# Patient Record
Sex: FEMALE | Race: ASIAN | Employment: PART TIME | URBAN - METROPOLITAN AREA
[De-identification: names, ages, dates, MRNs, and addresses within clinical notes are randomized per-mention and may not be internally consistent; named-entity substitution may affect disease eponyms.]

---

## 2017-01-09 ENCOUNTER — ROUTINE PRENATAL (OUTPATIENT)
Dept: OBGYN CLINIC | Facility: CLINIC | Age: 37
End: 2017-01-09

## 2017-01-09 VITALS
HEIGHT: 63.25 IN | WEIGHT: 161 LBS | BODY MASS INDEX: 28.17 KG/M2 | SYSTOLIC BLOOD PRESSURE: 110 MMHG | HEART RATE: 88 BPM | DIASTOLIC BLOOD PRESSURE: 73 MMHG

## 2017-01-09 DIAGNOSIS — Z34.03 ENCOUNTER FOR SUPERVISION OF NORMAL FIRST PREGNANCY IN THIRD TRIMESTER: Primary | ICD-10-CM

## 2017-01-09 LAB
APPEARANCE: CLEAR
MULTISTIX LOT#: NORMAL NUMERIC
PH, URINE: 6.5 (ref 4.5–8)
SPECIFIC GRAVITY: 1.01 (ref 1–1.03)
URINE-COLOR: YELLOW
UROBILINOGEN,SEMI-QN: 0.2 MG/DL (ref 0–1.9)

## 2017-01-09 NOTE — PROGRESS NOTES
Feeling well, active baby. Denies SOL. GBS obtained. Reviewed birth plan which is consistent with routine midwifery care in labor. Full review of SOL a n.pedro pablo. Reviewed warning signs and importance of good FM.

## 2017-01-16 ENCOUNTER — ROUTINE PRENATAL (OUTPATIENT)
Dept: OBGYN CLINIC | Facility: CLINIC | Age: 37
End: 2017-01-16

## 2017-01-16 VITALS
DIASTOLIC BLOOD PRESSURE: 66 MMHG | BODY MASS INDEX: 29 KG/M2 | WEIGHT: 163 LBS | SYSTOLIC BLOOD PRESSURE: 106 MMHG | HEART RATE: 78 BPM

## 2017-01-16 DIAGNOSIS — Z34.83 ENCOUNTER FOR SUPERVISION OF OTHER NORMAL PREGNANCY IN THIRD TRIMESTER: Primary | ICD-10-CM

## 2017-01-16 LAB
APPEARANCE: CLEAR
MULTISTIX LOT#: NORMAL NUMERIC
PH, URINE: 6.5 (ref 4.5–8)
SPECIFIC GRAVITY: 1.01 (ref 1–1.03)
URINE-COLOR: YELLOW
UROBILINOGEN,SEMI-QN: 0 MG/DL (ref 0–1.9)

## 2017-01-19 ENCOUNTER — TELEPHONE (OUTPATIENT)
Dept: OBGYN CLINIC | Facility: CLINIC | Age: 37
End: 2017-01-19

## 2017-01-20 ENCOUNTER — OFFICE VISIT (OUTPATIENT)
Dept: FAMILY MEDICINE CLINIC | Facility: CLINIC | Age: 37
End: 2017-01-20

## 2017-01-20 VITALS
SYSTOLIC BLOOD PRESSURE: 95 MMHG | TEMPERATURE: 98 F | BODY MASS INDEX: 28.53 KG/M2 | DIASTOLIC BLOOD PRESSURE: 68 MMHG | HEART RATE: 63 BPM | WEIGHT: 163 LBS | HEIGHT: 63.25 IN

## 2017-01-20 DIAGNOSIS — Z00.00 ROUTINE GENERAL MEDICAL EXAMINATION AT A HEALTH CARE FACILITY: Primary | ICD-10-CM

## 2017-01-20 PROCEDURE — 99385 PREV VISIT NEW AGE 18-39: CPT | Performed by: FAMILY MEDICINE

## 2017-01-20 PROCEDURE — G0438 PPPS, INITIAL VISIT: HCPCS | Performed by: FAMILY MEDICINE

## 2017-01-20 NOTE — PROGRESS NOTES
HPI:    Patient ID: Pattie Maza is a 31year old female. HPI  Patient presents with:  Routine Physical    Review of Systems   Constitutional: Negative. HENT: Negative. Eyes: Negative. Respiratory: Negative. Cardiovascular: Negative.     Mabelene Jackson place, and time. She has normal strength and normal reflexes. No sensory deficit. Psychiatric: Her mood appears not anxious. She does not exhibit a depressed mood.               ASSESSMENT/PLAN:   Routine general medical examination at a [facility] facil

## 2017-01-24 ENCOUNTER — ROUTINE PRENATAL (OUTPATIENT)
Dept: OBGYN CLINIC | Facility: CLINIC | Age: 37
End: 2017-01-24

## 2017-01-24 VITALS
WEIGHT: 162.19 LBS | DIASTOLIC BLOOD PRESSURE: 69 MMHG | HEART RATE: 75 BPM | BODY MASS INDEX: 28 KG/M2 | SYSTOLIC BLOOD PRESSURE: 102 MMHG

## 2017-01-24 DIAGNOSIS — Z34.93 ENCOUNTER FOR SUPERVISION OF NORMAL PREGNANCY IN THIRD TRIMESTER, UNSPECIFIED GRAVIDITY: Primary | ICD-10-CM

## 2017-01-24 LAB
MULTISTIX LOT#: NORMAL NUMERIC
PH, URINE: 6.5 (ref 4.5–8)
SPECIFIC GRAVITY: 1 (ref 1–1.03)
UROBILINOGEN,SEMI-QN: 0.2 MG/DL (ref 0–1.9)

## 2017-01-24 PROCEDURE — 59426 ANTEPARTUM CARE ONLY: CPT | Performed by: ADVANCED PRACTICE MIDWIFE

## 2017-01-24 NOTE — PROGRESS NOTES
Feeling well. No complaints. +FM. Rev pt's written birth plan. They are undecided about circumcision.   Rev signs of active labor/when to call

## 2017-01-28 ENCOUNTER — HOSPITAL ENCOUNTER (INPATIENT)
Facility: HOSPITAL | Age: 37
LOS: 3 days | Discharge: HOME | End: 2017-01-31
Attending: ADVANCED PRACTICE MIDWIFE | Admitting: OBSTETRICS & GYNECOLOGY
Payer: COMMERCIAL

## 2017-01-28 DIAGNOSIS — O92.29 POSTPARTUM NIPPLE PAIN: Primary | ICD-10-CM

## 2017-01-28 PROBLEM — Z34.90 PREGNANCY: Status: ACTIVE | Noted: 2017-01-28

## 2017-01-28 PROBLEM — Z34.90 PREGNANCY (HCC): Status: ACTIVE | Noted: 2017-01-28

## 2017-01-28 RX ORDER — SODIUM CHLORIDE 0.9 % (FLUSH) 0.9 %
10 SYRINGE (ML) INJECTION AS NEEDED
Status: DISCONTINUED | OUTPATIENT
Start: 2017-01-28 | End: 2017-01-29 | Stop reason: HOSPADM

## 2017-01-28 RX ORDER — AMMONIA INHALANTS 0.04 G/.3ML
0.3 INHALANT RESPIRATORY (INHALATION) AS NEEDED
Status: DISCONTINUED | OUTPATIENT
Start: 2017-01-28 | End: 2017-01-29 | Stop reason: HOSPADM

## 2017-01-28 RX ORDER — TERBUTALINE SULFATE 1 MG/ML
0.25 INJECTION, SOLUTION SUBCUTANEOUS AS NEEDED
Status: DISCONTINUED | OUTPATIENT
Start: 2017-01-28 | End: 2017-01-29 | Stop reason: HOSPADM

## 2017-01-28 RX ORDER — DEXTROSE, SODIUM CHLORIDE, SODIUM LACTATE, POTASSIUM CHLORIDE, AND CALCIUM CHLORIDE 5; .6; .31; .03; .02 G/100ML; G/100ML; G/100ML; G/100ML; G/100ML
125 INJECTION, SOLUTION INTRAVENOUS CONTINUOUS
Status: DISCONTINUED | OUTPATIENT
Start: 2017-01-28 | End: 2017-01-29 | Stop reason: HOSPADM

## 2017-01-28 RX ORDER — IBUPROFEN 600 MG/1
600 TABLET ORAL ONCE AS NEEDED
Status: DISCONTINUED | OUTPATIENT
Start: 2017-01-28 | End: 2017-01-29 | Stop reason: HOSPADM

## 2017-01-28 RX ORDER — LIDOCAINE HYDROCHLORIDE 10 MG/ML
30 INJECTION, SOLUTION EPIDURAL; INFILTRATION; INTRACAUDAL; PERINEURAL ONCE
Status: DISCONTINUED | OUTPATIENT
Start: 2017-01-28 | End: 2017-01-29 | Stop reason: HOSPADM

## 2017-01-29 PROBLEM — D25.9 FIBROID UTERUS: Status: ACTIVE | Noted: 2017-01-29

## 2017-01-29 PROBLEM — Z37.9 NORMAL LABOR: Status: ACTIVE | Noted: 2017-01-29

## 2017-01-29 PROBLEM — Z37.9 NORMAL LABOR (HCC): Status: ACTIVE | Noted: 2017-01-29

## 2017-01-29 LAB
ERYTHROCYTE [DISTWIDTH] IN BLOOD BY AUTOMATED COUNT: 15.1 % (ref 11–15)
HCT VFR BLD AUTO: 40.4 % (ref 35–48)
HGB BLD-MCNC: 13.4 G/DL (ref 12–16)
MCH RBC QN AUTO: 31.9 PG (ref 27–32)
MCHC RBC AUTO-ENTMCNC: 33.1 G/DL (ref 32–37)
MCV RBC AUTO: 96.2 FL (ref 80–100)
PLATELET # BLD AUTO: 207 K/UL (ref 140–400)
PMV BLD AUTO: 10.3 FL (ref 7.4–10.3)
RBC # BLD AUTO: 4.2 M/UL (ref 3.7–5.4)
RH BLOOD TYPE: POSITIVE
WBC # BLD AUTO: 6.4 K/UL (ref 4–11)

## 2017-01-29 PROCEDURE — 0KQM0ZZ REPAIR PERINEUM MUSCLE, OPEN APPROACH: ICD-10-PCS | Performed by: ADVANCED PRACTICE MIDWIFE

## 2017-01-29 PROCEDURE — 59410 OBSTETRICAL CARE: CPT | Performed by: ADVANCED PRACTICE MIDWIFE

## 2017-01-29 RX ORDER — BISACODYL 10 MG
10 SUPPOSITORY, RECTAL RECTAL ONCE AS NEEDED
Status: ACTIVE | OUTPATIENT
Start: 2017-01-29 | End: 2017-01-29

## 2017-01-29 RX ORDER — DOCUSATE SODIUM 100 MG/1
100 CAPSULE, LIQUID FILLED ORAL 2 TIMES DAILY
Status: DISCONTINUED | OUTPATIENT
Start: 2017-01-29 | End: 2017-01-31

## 2017-01-29 RX ORDER — SIMETHICONE 80 MG
80 TABLET,CHEWABLE ORAL 3 TIMES DAILY PRN
Status: DISCONTINUED | OUTPATIENT
Start: 2017-01-29 | End: 2017-01-31

## 2017-01-29 RX ORDER — DIAPER,BRIEF,INFANT-TODD,DISP
EACH MISCELLANEOUS EVERY 6 HOURS PRN
Status: DISCONTINUED | OUTPATIENT
Start: 2017-01-29 | End: 2017-01-31

## 2017-01-29 RX ORDER — IBUPROFEN 400 MG/1
400 TABLET ORAL EVERY 4 HOURS PRN
Status: DISCONTINUED | OUTPATIENT
Start: 2017-01-29 | End: 2017-01-31

## 2017-01-29 RX ORDER — AMMONIA INHALANTS 0.04 G/.3ML
0.3 INHALANT RESPIRATORY (INHALATION) AS NEEDED
Status: DISCONTINUED | OUTPATIENT
Start: 2017-01-29 | End: 2017-01-31

## 2017-01-29 RX ORDER — IBUPROFEN 600 MG/1
600 TABLET ORAL EVERY 4 HOURS PRN
Status: DISCONTINUED | OUTPATIENT
Start: 2017-01-29 | End: 2017-01-31

## 2017-01-29 RX ORDER — PRENATAL VIT,CAL 76/IRON/FOLIC 29 MG-1 MG
1 TABLET ORAL DAILY
Status: DISCONTINUED | OUTPATIENT
Start: 2017-01-29 | End: 2017-01-31

## 2017-01-29 RX ORDER — MISOPROSTOL 200 UG/1
TABLET ORAL
Status: DISCONTINUED
Start: 2017-01-29 | End: 2017-01-29 | Stop reason: WASHOUT

## 2017-01-29 RX ORDER — IBUPROFEN 400 MG/1
200 TABLET ORAL EVERY 4 HOURS PRN
Status: DISCONTINUED | OUTPATIENT
Start: 2017-01-29 | End: 2017-01-31

## 2017-01-29 NOTE — LACTATION NOTE
LACTATION NOTE - MOTHER           Problems identified  Problems identified: Knowledge deficit    Maternal history  Other/comment: fibroids    Breastfeeding goal  Breastfeeding goal: To maintain breast milk feeding per patient goal    Maternal Assessment  B

## 2017-01-29 NOTE — H&P
[place] [facility] - [facility]    History & Physical    Lamont Ramos Patient Status:  Inpatient    1980 MRN [id number]   Location [address] C-D Attending Orestes Rebolledo, [address] Day # 1 PCP No primary care provider on file.      Date o Oral Tab Take 1 tablet by mouth daily. Disp:  Rfl:  Taking   Omega-3-acid Ethyl Esters 1 G Oral Cap Take 1 g by mouth daily. Disp:  Rfl:  Taking       Review of Systems:   As documented in HPI        Physical Exam:   Temp:  [97.8 °F (36.6 °C)] 97.8 °F (36. rubella  MMR PP      Abnormal finding on  screen  See above, f/u Level 2 normal nuchal & structural Survey      Pregnancy-Active Labor  Intermittent monitoring  Expectant mgmt      Fibroid uterus  Insert SL          Risks, benefits, alternatives a

## 2017-01-29 NOTE — LACTATION NOTE
This note was copied from the chart of [address] [place] [address].   LACTATION NOTE - INFANT    Evaluation Type  Evaluation Type: Inpatient    Problems & Assessment  Infant Assessment: Skin color: pink or appropriate for ethnicity  Muscle tone: Appropriate for GA    Feeding

## 2017-01-29 NOTE — PROGRESS NOTES
Pt up to BR with assist x 2. Ambulated with steady gait. Voided 400ml. Pericare discussed and demonstrated. Peripad, Ice pack, tucks pads, underwear applied. Clean gown and warm blankets on. Pt to w/c for tx to PP.

## 2017-01-29 NOTE — PROGRESS NOTES
Pt to PP room 361 via w/c in stable condition holding baby. Belongings to room. Accompanied by sig other. This RN continues care.

## 2017-01-29 NOTE — L&D DELIVERY NOTE
[facility]    Vaginal Delivery Note    [address] [facility] Patient Status:  Inpatient    1980 MRN [id number]   Location [address] C-D Attending Orestes Rebolledo, [address] Day # 1 PCP No primary care provider on file.      Kaylan Nuno condition.      Intake/Output   EBL:  350 ml      Ayleen Moya [place]   1/29/2017  3:37 AM

## 2017-01-30 LAB
HCT VFR BLD AUTO: 31.7 % (ref 35–48)
HGB BLD-MCNC: 10.6 G/DL (ref 12–16)
T PALLIDUM AB SER QL: NEGATIVE

## 2017-01-30 NOTE — LACTATION NOTE
LACTATION NOTE - MOTHER           Problems identified  Problems identified: Knowledge deficit; Nipple pain    Maternal history  Other/comment: fibroids    Breastfeeding goal  Breastfeeding goal: To maintain breast milk feeding per patient goal    Maternal A

## 2017-01-30 NOTE — LACTATION NOTE
This note was copied from the chart of [address] [place] [address]. LACTATION NOTE - INFANT    Evaluation Type  Evaluation Type: Inpatient    Problems & Assessment  Infant Assessment: Skin color: pink or appropriate for ethnicity; Minimal hunger cues present  Muscle tone:  Ap

## 2017-01-30 NOTE — LACTATION NOTE
This note was copied from the chart of [address] [place] [address].   LACTATION NOTE - INFANT    Evaluation Type  Evaluation Type: Inpatient    Problems & Assessment  Infant Assessment: Skin color: pink or appropriate for ethnicity;Hunger cues present  Muscle tone: Appropriat

## 2017-01-30 NOTE — PLAN OF CARE
Patient Centered Care    • Patient preferences are identified and integrated in the patient's plan of care Progressing          POSTPARTUM    • [address] normal postpartum course Progressing    • Optimize infant feeding at the breast Progr

## 2017-01-31 VITALS
RESPIRATION RATE: 16 BRPM | HEIGHT: 63 IN | DIASTOLIC BLOOD PRESSURE: 66 MMHG | HEART RATE: 83 BPM | SYSTOLIC BLOOD PRESSURE: 113 MMHG | WEIGHT: 162 LBS | BODY MASS INDEX: 28.7 KG/M2 | TEMPERATURE: 98 F

## 2017-01-31 NOTE — DISCHARGE SUMMARY
[facility]    Discharge Summary    [address] [facility] [address] Patient Status:  Inpatient    1980 MRN [id number]   Location [address] Attending John Mccrary, [address] Day # 3       Delivering OB Clinician: Ileana Blackmon

## 2017-01-31 NOTE — LACTATION NOTE
Primary RN notified [address] [facility] infant has not had stool today.  LC assisted mom w/ assembly and use of manual pump and advised to pump a few minutes after feedings to have supplement of EBM to spoon or cup feed infant after feeding on demand at breast until her rachel

## 2017-01-31 NOTE — PROGRESS NOTES
[place] [facility] - [facility]    OB/GYNE Progress Note      [address] [facility] [address] Patient Status:  Inpatient    1980 MRN [id number]   Location [facility] 3SE Attending Jeanie oJn, [address] Day # 3 PCP No primary care provider on file.        As 10.6* 01/30/2017   HCT 31.7* 01/30/2017    01/29/2017         Lab Results  Component Value Date   SPECGRAVITY 1.005 01/24/2017   GLUUR neg 01/24/2017   NITRITE neg 01/24/2017             JULIO Bennett  1/31/2017  10:14 AM

## 2017-02-10 ENCOUNTER — POSTPARTUM (OUTPATIENT)
Dept: OBGYN CLINIC | Facility: CLINIC | Age: 37
End: 2017-02-10

## 2017-02-10 VITALS
BODY MASS INDEX: 26.64 KG/M2 | HEIGHT: 63.25 IN | HEART RATE: 84 BPM | WEIGHT: 152.25 LBS | SYSTOLIC BLOOD PRESSURE: 115 MMHG | DIASTOLIC BLOOD PRESSURE: 72 MMHG

## 2017-02-10 NOTE — PROGRESS NOTES
Pt is 2 wks PP s/p  of viable male infant. Breastfeeding successfully with infant with adequate weight gain. Denies any S&S of PP depression. Bleeding decreasing. Adequate family support. Denies any abuse. Warning signs reviewed.  F/u 4 wks or 6 wk

## 2017-03-02 ENCOUNTER — TELEPHONE (OUTPATIENT)
Dept: OBGYN CLINIC | Facility: CLINIC | Age: 37
End: 2017-03-02

## 2017-03-02 DIAGNOSIS — Z34.03 ENCOUNTER FOR SUPERVISION OF NORMAL FIRST PREGNANCY IN THIRD TRIMESTER: Primary | ICD-10-CM

## 2017-03-02 NOTE — TELEPHONE ENCOUNTER
Msg left on VM that referral has been entered for the Breast Pump and pt will get a copy in the mail once it is approved.

## 2017-03-02 NOTE — TELEPHONE ENCOUNTER
PT STTS SHE ORDERED HER BREAST PUMP [facility]  PH#  [phone] EXT [phone] OPTION 5 , HOWEVER NEEDS A REFERRAL , PLS ADVS

## 2017-03-02 NOTE — TELEPHONE ENCOUNTER
Pt informed of message below. Pt advised to contact jorge a gutiérrez once she has approved referral. Pt verbalizes understanding.

## 2017-03-10 ENCOUNTER — OFFICE VISIT (OUTPATIENT)
Dept: OBGYN CLINIC | Facility: CLINIC | Age: 37
End: 2017-03-10

## 2017-03-10 VITALS
HEIGHT: 63.25 IN | WEIGHT: 152 LBS | SYSTOLIC BLOOD PRESSURE: 110 MMHG | HEART RATE: 79 BPM | DIASTOLIC BLOOD PRESSURE: 72 MMHG | BODY MASS INDEX: 26.6 KG/M2

## 2017-03-10 NOTE — PROGRESS NOTES
Patient here for postpartum check-up. Vaginal delivery @ 6 weeks ago with ALICJA NAVAS. Breastfeeding exclusively, on demand. Reports baby on small side, but Peds not having her supplement as of yet. Baby is satisfied after feedings. Just received birth plan.

## 2017-11-17 ENCOUNTER — OFFICE VISIT (OUTPATIENT)
Dept: FAMILY MEDICINE CLINIC | Facility: CLINIC | Age: 37
End: 2017-11-17

## 2017-11-17 VITALS
WEIGHT: 134.63 LBS | SYSTOLIC BLOOD PRESSURE: 103 MMHG | TEMPERATURE: 98 F | BODY MASS INDEX: 23.86 KG/M2 | HEART RATE: 67 BPM | DIASTOLIC BLOOD PRESSURE: 70 MMHG | HEIGHT: 63 IN

## 2017-11-17 DIAGNOSIS — Z00.00 ROUTINE GENERAL MEDICAL EXAMINATION AT A HEALTH CARE FACILITY: Primary | ICD-10-CM

## 2017-11-17 PROCEDURE — 99395 PREV VISIT EST AGE 18-39: CPT | Performed by: PHYSICIAN ASSISTANT

## 2017-11-17 NOTE — PROGRESS NOTES
HPI:   Francis Davis is a 30year old female who presents for physical exam.  She denies any acute problems or concerns. She has 9 month old son she delivered naturally. She denies any problems during pregnancy. No elevation of glucose level or anemia. of Systems:   CONSTITUTIONAL:  Denies unusual weight gain/loss, fever, chills, or fatigue. EENT:  Eyes:  Denies eye pain, visual loss, blurred vision, double vision or yellow sclerae.  Ears, Nose, Throat:  Denies hearing loss, congestion, runny nose or sor CLAD, no JVD, no thyromegaly. SKIN: No rashes, no skin lesion, no bruising, good turgor. HEART:  Regular rate and rhythm, no murmurs, rubs or gallops. LUNGS: Clear to auscultation bilterally, no rales/rhonchi/wheezing.   ABDOMEN:  Soft, nondistended, non

## 2017-12-20 NOTE — PROGRESS NOTES
[place] [facility] - [facility]    OB/GYNE Progress Note      [address] Patient Status:  Inpatient    1980 MRN [id number]   Location [facility] 3SE Attending Barbara Giraldo, [address] Day # 2 PCP No primary care provider on file.        As 01/24/2017   GLUUR neg 01/24/2017   NITRITE neg 01/24/2017             Alcira Porter CNM  1/30/2017  11:05 AM detailed exam details…

## 2018-03-28 ENCOUNTER — LAB ENCOUNTER (OUTPATIENT)
Dept: LAB | Age: 38
End: 2018-03-28
Attending: PHYSICIAN ASSISTANT
Payer: COMMERCIAL

## 2018-03-28 DIAGNOSIS — Z00.00 ROUTINE GENERAL MEDICAL EXAMINATION AT A HEALTH CARE FACILITY: ICD-10-CM

## 2018-03-28 LAB
ALBUMIN SERPL BCP-MCNC: 4.6 G/DL (ref 3.5–4.8)
ALBUMIN/GLOB SERPL: 1.6 {RATIO} (ref 1–2)
ALP SERPL-CCNC: 70 U/L (ref 32–100)
ALT SERPL-CCNC: 16 U/L (ref 14–54)
ANION GAP SERPL CALC-SCNC: 10 MMOL/L (ref 0–18)
AST SERPL-CCNC: 18 U/L (ref 15–41)
BASOPHILS # BLD: 0.1 K/UL (ref 0–0.2)
BASOPHILS NFR BLD: 1 %
BILIRUB SERPL-MCNC: 0.8 MG/DL (ref 0.3–1.2)
BUN SERPL-MCNC: 12 MG/DL (ref 8–20)
BUN/CREAT SERPL: 13.8 (ref 10–20)
CALCIUM SERPL-MCNC: 9.3 MG/DL (ref 8.5–10.5)
CHLORIDE SERPL-SCNC: 101 MMOL/L (ref 95–110)
CHOLEST SERPL-MCNC: 174 MG/DL (ref 110–200)
CO2 SERPL-SCNC: 28 MMOL/L (ref 22–32)
CREAT SERPL-MCNC: 0.87 MG/DL (ref 0.5–1.5)
EOSINOPHIL # BLD: 0.1 K/UL (ref 0–0.7)
EOSINOPHIL NFR BLD: 2 %
ERYTHROCYTE [DISTWIDTH] IN BLOOD BY AUTOMATED COUNT: 13.8 % (ref 11–15)
GLOBULIN PLAS-MCNC: 2.8 G/DL (ref 2.5–3.7)
GLUCOSE SERPL-MCNC: 87 MG/DL (ref 70–99)
HCT VFR BLD AUTO: 39.8 % (ref 35–48)
HDLC SERPL-MCNC: 72 MG/DL
HGB BLD-MCNC: 13.6 G/DL (ref 12–16)
LDLC SERPL CALC-MCNC: 82 MG/DL (ref 0–99)
LYMPHOCYTES # BLD: 1.5 K/UL (ref 1–4)
LYMPHOCYTES NFR BLD: 29 %
MCH RBC QN AUTO: 31.9 PG (ref 27–32)
MCHC RBC AUTO-ENTMCNC: 34.2 G/DL (ref 32–37)
MCV RBC AUTO: 93.2 FL (ref 80–100)
MONOCYTES # BLD: 0.3 K/UL (ref 0–1)
MONOCYTES NFR BLD: 6 %
NEUTROPHILS # BLD AUTO: 3.2 K/UL (ref 1.8–7.7)
NEUTROPHILS NFR BLD: 61 %
NONHDLC SERPL-MCNC: 102 MG/DL
OSMOLALITY UR CALC.SUM OF ELEC: 287 MOSM/KG (ref 275–295)
PATIENT FASTING: YES
PLATELET # BLD AUTO: 281 K/UL (ref 140–400)
PMV BLD AUTO: 9.1 FL (ref 7.4–10.3)
POTASSIUM SERPL-SCNC: 3.6 MMOL/L (ref 3.3–5.1)
PROT SERPL-MCNC: 7.4 G/DL (ref 5.9–8.4)
RBC # BLD AUTO: 4.27 M/UL (ref 3.7–5.4)
SODIUM SERPL-SCNC: 139 MMOL/L (ref 136–144)
TRIGL SERPL-MCNC: 99 MG/DL (ref 1–149)
TSH SERPL-ACNC: 2.86 UIU/ML (ref 0.45–5.33)
WBC # BLD AUTO: 5.2 K/UL (ref 4–11)

## 2018-03-28 PROCEDURE — 85025 COMPLETE CBC W/AUTO DIFF WBC: CPT

## 2018-03-28 PROCEDURE — 84443 ASSAY THYROID STIM HORMONE: CPT

## 2018-03-28 PROCEDURE — 80061 LIPID PANEL: CPT

## 2018-03-28 PROCEDURE — 36415 COLL VENOUS BLD VENIPUNCTURE: CPT

## 2018-03-28 PROCEDURE — 80050 GENERAL HEALTH PANEL: CPT

## 2018-04-17 ENCOUNTER — OFFICE VISIT (OUTPATIENT)
Dept: OBGYN CLINIC | Facility: CLINIC | Age: 38
End: 2018-04-17

## 2018-04-17 VITALS
DIASTOLIC BLOOD PRESSURE: 78 MMHG | BODY MASS INDEX: 23.99 KG/M2 | WEIGHT: 135.38 LBS | HEART RATE: 92 BPM | SYSTOLIC BLOOD PRESSURE: 113 MMHG | HEIGHT: 63 IN

## 2018-04-17 DIAGNOSIS — N92.6 MISSED MENSES: Primary | ICD-10-CM

## 2018-04-17 PROCEDURE — 81025 URINE PREGNANCY TEST: CPT | Performed by: ADVANCED PRACTICE MIDWIFE

## 2018-04-17 PROCEDURE — 99213 OFFICE O/P EST LOW 20 MIN: CPT | Performed by: ADVANCED PRACTICE MIDWIFE

## 2018-04-17 NOTE — PROGRESS NOTES
HPI:    Patient ID: Lane May is a 30year old female here for missed menses.  with our practice last year. Has irregular cycles q 55-60 days. Stopped nursing 1 mn ago. + fatigue. Denies pain, bleeding or nausea. Not taking PNV.      HPI    Review of

## 2018-04-20 ENCOUNTER — HOSPITAL ENCOUNTER (OUTPATIENT)
Dept: ULTRASOUND IMAGING | Facility: HOSPITAL | Age: 38
Discharge: HOME | End: 2018-04-20
Attending: ADVANCED PRACTICE MIDWIFE
Payer: COMMERCIAL

## 2018-04-20 DIAGNOSIS — N92.6 MISSED MENSES: ICD-10-CM

## 2018-04-20 PROCEDURE — 76801 OB US < 14 WKS SINGLE FETUS: CPT | Performed by: ADVANCED PRACTICE MIDWIFE

## 2018-04-20 PROCEDURE — 76817 TRANSVAGINAL US OBSTETRIC: CPT | Performed by: ADVANCED PRACTICE MIDWIFE

## 2018-04-23 ENCOUNTER — NURSE ONLY (OUTPATIENT)
Dept: OBGYN CLINIC | Facility: CLINIC | Age: 38
End: 2018-04-23

## 2018-04-23 VITALS — WEIGHT: 135 LBS | BODY MASS INDEX: 24 KG/M2

## 2018-04-23 DIAGNOSIS — O09.521 ELDERLY MULTIGRAVIDA IN FIRST TRIMESTER: ICD-10-CM

## 2018-04-23 DIAGNOSIS — Z34.81 PRENATAL CARE, SUBSEQUENT PREGNANCY IN FIRST TRIMESTER: ICD-10-CM

## 2018-04-23 DIAGNOSIS — O36.80X0 PREGNANCY WITH INCONCLUSIVE FETAL VIABILITY, SINGLE OR UNSPECIFIED FETUS: Primary | ICD-10-CM

## 2018-04-23 RX ORDER — CHOLECALCIFEROL (VITAMIN D3) 25 MCG
1 TABLET,CHEWABLE ORAL DAILY
COMMUNITY
End: 2019-01-15

## 2018-04-23 NOTE — PROGRESS NOTES
Nurse education completed via phone & information placed in envelope at  for pt to . Pt unsure of lmp. U/S on 4/20 questionable fht's & poss early pregnancy, not reviewed by CNM.  Per [facility] JUAN, ok to complete ED visit but have pt complete another

## 2018-05-04 ENCOUNTER — HOSPITAL ENCOUNTER (OUTPATIENT)
Dept: ULTRASOUND IMAGING | Facility: HOSPITAL | Age: 38
Discharge: HOME | End: 2018-05-04
Attending: ADVANCED PRACTICE MIDWIFE
Payer: COMMERCIAL

## 2018-05-04 DIAGNOSIS — O09.521 ELDERLY MULTIGRAVIDA IN FIRST TRIMESTER: ICD-10-CM

## 2018-05-04 DIAGNOSIS — O36.80X0 PREGNANCY WITH INCONCLUSIVE FETAL VIABILITY, SINGLE OR UNSPECIFIED FETUS: ICD-10-CM

## 2018-05-04 DIAGNOSIS — Z34.81 PRENATAL CARE, SUBSEQUENT PREGNANCY IN FIRST TRIMESTER: ICD-10-CM

## 2018-05-04 PROCEDURE — 76801 OB US < 14 WKS SINGLE FETUS: CPT | Performed by: ADVANCED PRACTICE MIDWIFE

## 2018-05-04 PROCEDURE — 76817 TRANSVAGINAL US OBSTETRIC: CPT | Performed by: ADVANCED PRACTICE MIDWIFE

## 2018-05-18 ENCOUNTER — LAB ENCOUNTER (OUTPATIENT)
Dept: LAB | Facility: HOSPITAL | Age: 38
End: 2018-05-18
Attending: ADVANCED PRACTICE MIDWIFE
Payer: COMMERCIAL

## 2018-05-18 DIAGNOSIS — Z34.81 PRENATAL CARE, SUBSEQUENT PREGNANCY IN FIRST TRIMESTER: ICD-10-CM

## 2018-05-18 DIAGNOSIS — O09.521 ELDERLY MULTIGRAVIDA IN FIRST TRIMESTER: ICD-10-CM

## 2018-05-18 PROCEDURE — 84443 ASSAY THYROID STIM HORMONE: CPT

## 2018-05-18 PROCEDURE — 86780 TREPONEMA PALLIDUM: CPT

## 2018-05-18 PROCEDURE — 36415 COLL VENOUS BLD VENIPUNCTURE: CPT

## 2018-05-18 PROCEDURE — 86803 HEPATITIS C AB TEST: CPT

## 2018-05-18 PROCEDURE — 86901 BLOOD TYPING SEROLOGIC RH(D): CPT

## 2018-05-18 PROCEDURE — 86762 RUBELLA ANTIBODY: CPT

## 2018-05-18 PROCEDURE — 83036 HEMOGLOBIN GLYCOSYLATED A1C: CPT

## 2018-05-18 PROCEDURE — 86900 BLOOD TYPING SEROLOGIC ABO: CPT

## 2018-05-18 PROCEDURE — 87340 HEPATITIS B SURFACE AG IA: CPT

## 2018-05-18 PROCEDURE — 86850 RBC ANTIBODY SCREEN: CPT

## 2018-05-18 PROCEDURE — 87389 HIV-1 AG W/HIV-1&-2 AB AG IA: CPT

## 2018-05-18 PROCEDURE — 85025 COMPLETE CBC W/AUTO DIFF WBC: CPT

## 2018-05-18 PROCEDURE — 87086 URINE CULTURE/COLONY COUNT: CPT

## 2018-05-18 PROCEDURE — 82950 GLUCOSE TEST: CPT

## 2018-06-01 ENCOUNTER — OFFICE VISIT (OUTPATIENT)
Dept: OBGYN CLINIC | Facility: CLINIC | Age: 38
End: 2018-06-01

## 2018-06-01 VITALS
SYSTOLIC BLOOD PRESSURE: 101 MMHG | BODY MASS INDEX: 24.27 KG/M2 | WEIGHT: 137 LBS | DIASTOLIC BLOOD PRESSURE: 66 MMHG | HEART RATE: 80 BPM | HEIGHT: 63 IN

## 2018-06-01 DIAGNOSIS — Z34.81 PRENATAL CARE, SUBSEQUENT PREGNANCY, FIRST TRIMESTER: Primary | ICD-10-CM

## 2018-06-01 PROBLEM — Z37.9 NORMAL LABOR: Status: RESOLVED | Noted: 2017-01-29 | Resolved: 2018-06-01

## 2018-06-01 PROBLEM — Z34.90 PREGNANCY: Status: RESOLVED | Noted: 2017-01-28 | Resolved: 2018-06-01

## 2018-06-01 PROBLEM — Z34.90 PREGNANCY (HCC): Status: RESOLVED | Noted: 2017-01-28 | Resolved: 2018-06-01

## 2018-06-01 PROBLEM — Z37.9 NORMAL LABOR (HCC): Status: RESOLVED | Noted: 2017-01-29 | Resolved: 2018-06-01

## 2018-06-01 PROCEDURE — 81002 URINALYSIS NONAUTO W/O SCOPE: CPT | Performed by: ADVANCED PRACTICE MIDWIFE

## 2018-06-27 ENCOUNTER — OFFICE VISIT (OUTPATIENT)
Dept: OBGYN CLINIC | Facility: CLINIC | Age: 38
End: 2018-06-27

## 2018-06-27 VITALS
BODY MASS INDEX: 25 KG/M2 | WEIGHT: 140 LBS | SYSTOLIC BLOOD PRESSURE: 110 MMHG | DIASTOLIC BLOOD PRESSURE: 70 MMHG | HEART RATE: 80 BPM

## 2018-06-27 DIAGNOSIS — Z34.82 ENCOUNTER FOR SUPERVISION OF OTHER NORMAL PREGNANCY IN SECOND TRIMESTER: Primary | ICD-10-CM

## 2018-06-27 PROBLEM — O09.522 ADVANCED MATERNAL AGE IN MULTIGRAVIDA, SECOND TRIMESTER (HCC): Status: ACTIVE | Noted: 2018-06-27

## 2018-06-27 PROBLEM — O09.522 ADVANCED MATERNAL AGE IN MULTIGRAVIDA, SECOND TRIMESTER: Status: ACTIVE | Noted: 2018-06-27

## 2018-06-27 PROCEDURE — 81002 URINALYSIS NONAUTO W/O SCOPE: CPT | Performed by: ADVANCED PRACTICE MIDWIFE

## 2018-07-19 ENCOUNTER — TELEPHONE (OUTPATIENT)
Dept: OBGYN CLINIC | Facility: CLINIC | Age: 38
End: 2018-07-19

## 2018-07-19 DIAGNOSIS — D25.9 UTERINE LEIOMYOMA, UNSPECIFIED LOCATION: ICD-10-CM

## 2018-07-19 DIAGNOSIS — O09.522 AMA (ADVANCED MATERNAL AGE) MULTIGRAVIDA 35+, SECOND TRIMESTER: Primary | ICD-10-CM

## 2018-07-19 NOTE — TELEPHONE ENCOUNTER
Notified pt that order is now placed in EPIC for level 2 u/s w/ MFM & she can call & schedule it.  Pt verbalized an understanding & agrees w/ plan

## 2018-07-19 NOTE — TELEPHONE ENCOUNTER
[id number] [facility] central Scheduling states pt is calling to make appt for US, but no order in Epic.  Please advise

## 2018-08-01 ENCOUNTER — HOSPITAL ENCOUNTER (OUTPATIENT)
Dept: PERINATAL CARE | Facility: HOSPITAL | Age: 38
Discharge: HOME | End: 2018-08-01
Attending: ADVANCED PRACTICE MIDWIFE
Payer: COMMERCIAL

## 2018-08-01 ENCOUNTER — HOSPITAL ENCOUNTER (OUTPATIENT)
Dept: PERINATAL CARE | Facility: HOSPITAL | Age: 38
Discharge: HOME | End: 2018-08-01
Attending: OBSTETRICS & GYNECOLOGY
Payer: COMMERCIAL

## 2018-08-01 VITALS — SYSTOLIC BLOOD PRESSURE: 110 MMHG | DIASTOLIC BLOOD PRESSURE: 72 MMHG | HEART RATE: 89 BPM

## 2018-08-01 DIAGNOSIS — O09.522 ADVANCED MATERNAL AGE IN MULTIGRAVIDA, SECOND TRIMESTER: ICD-10-CM

## 2018-08-01 DIAGNOSIS — D25.9 UTERINE LEIOMYOMA, UNSPECIFIED LOCATION: ICD-10-CM

## 2018-08-01 DIAGNOSIS — O09.522 ADVANCED MATERNAL AGE IN MULTIGRAVIDA, SECOND TRIMESTER: Primary | ICD-10-CM

## 2018-08-01 PROCEDURE — 76811 OB US DETAILED SNGL FETUS: CPT | Performed by: OBSTETRICS & GYNECOLOGY

## 2018-08-01 PROCEDURE — 99243 OFF/OP CNSLTJ NEW/EST LOW 30: CPT | Performed by: OBSTETRICS & GYNECOLOGY

## 2018-08-01 NOTE — PROGRESS NOTES
Reason for Consult:   Dear Ms. Evonne Raza,    Thank you for requesting ultrasound evaluation and maternal fetal medicine consultation on Lamont Hood. As you are aware she is a 41year old female with a Jama pregnancy at 22w2d.   A maternal-fetal me nontender, no edema        Advanced maternal age typically refers to a pregnant woman who will be 28years of age or older on the estimated date of confinement.  The increased occurrence of births at older maternal ages relates not only to the increased num fetus has a normal karyotype. Women 28years of age or older can expect to experience two to three fold higher rates of hospitalization,  delivery, and pregnancy-related complications as their younger counterparts.   The two most com amniocentesis with any chromosome abnormality is about 1: 100 and with Down Syndrome is about 1: 190. We also discussed the risks and benefits of having  genetic testing (CVS and amniocentesis) performed.             OB ULTRASOUND REPORT   See imaging tab Total patient time was 40 minutes in evaluation, consultation, and coordination of care. Greater than 50% of this time was spent in face to face discussion with the patient.

## 2018-08-08 ENCOUNTER — TELEPHONE (OUTPATIENT)
Dept: OBGYN CLINIC | Facility: CLINIC | Age: 38
End: 2018-08-08

## 2018-08-10 ENCOUNTER — ROUTINE PRENATAL (OUTPATIENT)
Dept: OBGYN CLINIC | Facility: CLINIC | Age: 38
End: 2018-08-10

## 2018-08-10 VITALS
SYSTOLIC BLOOD PRESSURE: 103 MMHG | HEART RATE: 71 BPM | WEIGHT: 149 LBS | BODY MASS INDEX: 26 KG/M2 | DIASTOLIC BLOOD PRESSURE: 69 MMHG

## 2018-08-10 DIAGNOSIS — Z34.82 ENCOUNTER FOR SUPERVISION OF OTHER NORMAL PREGNANCY IN SECOND TRIMESTER: Primary | ICD-10-CM

## 2018-08-10 LAB
MULTISTIX LOT#: NORMAL NUMERIC
PH, URINE: 7 (ref 4.5–8)
SPECIFIC GRAVITY: 1.01 (ref 1–1.03)
URINE-COLOR: YELLOW
UROBILINOGEN,SEMI-QN: 0.2 MG/DL (ref 0–1.9)

## 2018-08-10 PROCEDURE — 81002 URINALYSIS NONAUTO W/O SCOPE: CPT | Performed by: ADVANCED PRACTICE MIDWIFE

## 2018-09-19 ENCOUNTER — ROUTINE PRENATAL (OUTPATIENT)
Dept: OBGYN CLINIC | Facility: CLINIC | Age: 38
End: 2018-09-19

## 2018-09-19 VITALS
SYSTOLIC BLOOD PRESSURE: 104 MMHG | BODY MASS INDEX: 27.29 KG/M2 | WEIGHT: 154 LBS | HEIGHT: 63 IN | HEART RATE: 80 BPM | DIASTOLIC BLOOD PRESSURE: 70 MMHG

## 2018-09-19 DIAGNOSIS — Z34.82 PRENATAL CARE, SUBSEQUENT PREGNANCY, SECOND TRIMESTER: Primary | ICD-10-CM

## 2018-09-19 LAB
APPEARANCE: CLEAR
MULTISTIX LOT#: NORMAL NUMERIC
PH, URINE: 7 (ref 4.5–8)
SPECIFIC GRAVITY: 1.01 (ref 1–1.03)
URINE-COLOR: YELLOW
UROBILINOGEN,SEMI-QN: 0.2 MG/DL (ref 0–1.9)

## 2018-09-19 PROCEDURE — 81002 URINALYSIS NONAUTO W/O SCOPE: CPT | Performed by: ADVANCED PRACTICE MIDWIFE

## 2018-09-19 NOTE — PROGRESS NOTES
Active fetus  No signs signs of PTL. Reviewed S&S of PTL  Warning signs reviewed  All questions answered.   Declining Flu vaccine at this visit  Ask again with TDAP

## 2018-10-24 NOTE — PROGRESS NOTES
Outpatient Maternal-Fetal Medicine Follow-Up     Dear Ms. Ashraf,     Thank you for requesting ultrasound evaluation and maternal fetal medicine consultation on your patient [facility].   As you are aware she is a 41year old female  with a Single yellow. We discussed increased fluid intake. She is to return in 1-2 weeks to assess the HUBERT. We discussed the recommended plan of care based on her  risk factors.   Lilly and her significant other, Frieda, had their questions answered to their sat

## 2018-10-26 ENCOUNTER — HOSPITAL ENCOUNTER (OUTPATIENT)
Dept: PERINATAL CARE | Facility: HOSPITAL | Age: 38
Discharge: HOME | End: 2018-10-26
Attending: OBSTETRICS & GYNECOLOGY
Payer: COMMERCIAL

## 2018-10-26 ENCOUNTER — ROUTINE PRENATAL (OUTPATIENT)
Dept: OBGYN CLINIC | Facility: CLINIC | Age: 38
End: 2018-10-26

## 2018-10-26 VITALS
HEART RATE: 90 BPM | BODY MASS INDEX: 30 KG/M2 | DIASTOLIC BLOOD PRESSURE: 65 MMHG | SYSTOLIC BLOOD PRESSURE: 106 MMHG | WEIGHT: 169 LBS

## 2018-10-26 VITALS — SYSTOLIC BLOOD PRESSURE: 120 MMHG | HEART RATE: 91 BPM | DIASTOLIC BLOOD PRESSURE: 73 MMHG

## 2018-10-26 DIAGNOSIS — O41.90X0: ICD-10-CM

## 2018-10-26 DIAGNOSIS — D25.9 UTERINE LEIOMYOMA, UNSPECIFIED LOCATION: ICD-10-CM

## 2018-10-26 DIAGNOSIS — O09.522 ADVANCED MATERNAL AGE IN MULTIGRAVIDA, SECOND TRIMESTER: Primary | ICD-10-CM

## 2018-10-26 DIAGNOSIS — Z34.83 ENCOUNTER FOR SUPERVISION OF OTHER NORMAL PREGNANCY IN THIRD TRIMESTER: Primary | ICD-10-CM

## 2018-10-26 DIAGNOSIS — O09.522 ADVANCED MATERNAL AGE IN MULTIGRAVIDA, SECOND TRIMESTER: ICD-10-CM

## 2018-10-26 PROBLEM — O28.8 AFI (AMNIOTIC FLUID INDEX) BORDERLINE LOW: Status: ACTIVE | Noted: 2018-10-26

## 2018-10-26 PROCEDURE — 76805 OB US >/= 14 WKS SNGL FETUS: CPT | Performed by: OBSTETRICS & GYNECOLOGY

## 2018-10-26 PROCEDURE — 76819 FETAL BIOPHYS PROFIL W/O NST: CPT | Performed by: OBSTETRICS & GYNECOLOGY

## 2018-10-26 PROCEDURE — 99214 OFFICE O/P EST MOD 30 MIN: CPT | Performed by: OBSTETRICS & GYNECOLOGY

## 2018-10-26 NOTE — ADDENDUM NOTE
Encounter addended by: Lizet Morfin on: 10/26/2018 2:59 PM   Actions taken: Charge Capture section accepted

## 2018-10-26 NOTE — PROGRESS NOTES
Just had growth u/s, normal growth @ 44% but HUBERT borderline low @ 7.6 and has f/u in 2 wks. Advised to increase fluids, fetal kick counts reviewed. No signs PTL, no LOF or bleeding. 3rd tri labs ordered today, had not been ordered previously.  Advised to do

## 2018-11-05 NOTE — PROGRESS NOTES
Outpatient Maternal-Fetal Medicine Follow-Up     Dear Ms. Ashraf,     Thank you for requesting ultrasound evaluation and maternal fetal medicine consultation on your patient Lamont Frederic Earl you are aware she is a 41year old female  with a Single · Advanced maternal age  · Subjectively low amniotic fluid     RECOMMENDATIONS:  · Continue care with Ms. Ashraf  · Weekly NST at 36 weeks       Thank you for allowing me to participate in the care of your patient. Rimma Kelly do not hesitate to contact me

## 2018-11-07 ENCOUNTER — APPOINTMENT (OUTPATIENT)
Dept: LAB | Facility: HOSPITAL | Age: 38
End: 2018-11-07
Attending: ADVANCED PRACTICE MIDWIFE
Payer: COMMERCIAL

## 2018-11-07 DIAGNOSIS — Z34.83 ENCOUNTER FOR SUPERVISION OF OTHER NORMAL PREGNANCY IN THIRD TRIMESTER: ICD-10-CM

## 2018-11-07 PROCEDURE — 82950 GLUCOSE TEST: CPT

## 2018-11-07 PROCEDURE — 85027 COMPLETE CBC AUTOMATED: CPT

## 2018-11-07 PROCEDURE — 36415 COLL VENOUS BLD VENIPUNCTURE: CPT

## 2018-11-07 PROCEDURE — 86780 TREPONEMA PALLIDUM: CPT

## 2018-11-07 PROCEDURE — 87389 HIV-1 AG W/HIV-1&-2 AB AG IA: CPT

## 2018-11-09 ENCOUNTER — HOSPITAL ENCOUNTER (OUTPATIENT)
Dept: PERINATAL CARE | Facility: HOSPITAL | Age: 38
Discharge: HOME | End: 2018-11-09
Attending: OBSTETRICS & GYNECOLOGY
Payer: COMMERCIAL

## 2018-11-09 ENCOUNTER — ROUTINE PRENATAL (OUTPATIENT)
Dept: OBGYN CLINIC | Facility: CLINIC | Age: 38
End: 2018-11-09

## 2018-11-09 VITALS
BODY MASS INDEX: 29 KG/M2 | WEIGHT: 165 LBS | SYSTOLIC BLOOD PRESSURE: 120 MMHG | HEART RATE: 74 BPM | DIASTOLIC BLOOD PRESSURE: 74 MMHG

## 2018-11-09 VITALS
WEIGHT: 167.5 LBS | BODY MASS INDEX: 29.68 KG/M2 | DIASTOLIC BLOOD PRESSURE: 75 MMHG | HEIGHT: 63 IN | HEART RATE: 91 BPM | SYSTOLIC BLOOD PRESSURE: 117 MMHG

## 2018-11-09 DIAGNOSIS — O28.8 AFI (AMNIOTIC FLUID INDEX) BORDERLINE LOW: ICD-10-CM

## 2018-11-09 DIAGNOSIS — D25.9 UTERINE LEIOMYOMA, UNSPECIFIED LOCATION: ICD-10-CM

## 2018-11-09 DIAGNOSIS — Z34.83 PRENATAL CARE, SUBSEQUENT PREGNANCY, THIRD TRIMESTER: Primary | ICD-10-CM

## 2018-11-09 DIAGNOSIS — O09.523 AMA (ADVANCED MATERNAL AGE) MULTIGRAVIDA 35+, THIRD TRIMESTER: ICD-10-CM

## 2018-11-09 DIAGNOSIS — O28.8 AFI (AMNIOTIC FLUID INDEX) BORDERLINE LOW: Primary | ICD-10-CM

## 2018-11-09 PROBLEM — R73.02 IMPAIRED GLUCOSE TOLERANCE: Status: ACTIVE | Noted: 2018-11-09

## 2018-11-09 PROCEDURE — 76815 OB US LIMITED FETUS(S): CPT | Performed by: OBSTETRICS & GYNECOLOGY

## 2018-11-09 PROCEDURE — 81002 URINALYSIS NONAUTO W/O SCOPE: CPT | Performed by: ADVANCED PRACTICE MIDWIFE

## 2018-11-09 PROCEDURE — 99213 OFFICE O/P EST LOW 20 MIN: CPT | Performed by: OBSTETRICS & GYNECOLOGY

## 2018-11-09 NOTE — PROGRESS NOTES
Declines flu & tdap. Had f/u ultrasound today. HUBERT 7.4, BPP 8/8. Discussed NST's starting at 36 wks. Failed 1hr and needs to do 3hr ASAP as is almost 35 wks. Importance of active FM. Call with decreased FM, regular contractions, LOF or bleeding.

## 2018-11-13 ENCOUNTER — LABORATORY ENCOUNTER (OUTPATIENT)
Dept: LAB | Facility: HOSPITAL | Age: 38
End: 2018-11-13
Attending: ADVANCED PRACTICE MIDWIFE
Payer: COMMERCIAL

## 2018-11-13 ENCOUNTER — TELEPHONE (OUTPATIENT)
Dept: OBGYN CLINIC | Facility: CLINIC | Age: 38
End: 2018-11-13

## 2018-11-13 DIAGNOSIS — O24.419 GESTATIONAL DIABETES MELLITUS (GDM) IN THIRD TRIMESTER, GESTATIONAL DIABETES METHOD OF CONTROL UNSPECIFIED: Primary | ICD-10-CM

## 2018-11-13 DIAGNOSIS — Z34.83 PRENATAL CARE, SUBSEQUENT PREGNANCY, THIRD TRIMESTER: ICD-10-CM

## 2018-11-13 PROBLEM — O24.410 DIET CONTROLLED GESTATIONAL DIABETES MELLITUS (GDM) IN THIRD TRIMESTER (HCC): Status: ACTIVE | Noted: 2018-11-13

## 2018-11-13 PROBLEM — R73.02 IMPAIRED GLUCOSE TOLERANCE: Status: RESOLVED | Noted: 2018-11-09 | Resolved: 2018-11-13

## 2018-11-13 PROBLEM — O24.410 DIET CONTROLLED GESTATIONAL DIABETES MELLITUS (GDM) IN THIRD TRIMESTER: Status: ACTIVE | Noted: 2018-11-13

## 2018-11-13 PROCEDURE — 83036 HEMOGLOBIN GLYCOSYLATED A1C: CPT

## 2018-11-13 PROCEDURE — 36415 COLL VENOUS BLD VENIPUNCTURE: CPT

## 2018-11-13 PROCEDURE — 82951 GLUCOSE TOLERANCE TEST (GTT): CPT

## 2018-11-13 PROCEDURE — 82952 GTT-ADDED SAMPLES: CPT

## 2018-11-13 NOTE — TELEPHONE ENCOUNTER
----- Message from Jeannette Mercado CNM sent at 11/13/2018  4:21 PM CST -----  Please let pt know she failed her 3 hr and is gestationally diabetic.  She is already 35 wks, so we need her to schedule with diabetic educator ASAP and start checking her blo

## 2018-11-13 NOTE — TELEPHONE ENCOUNTER
Spoke with pt advised of lab results and MJ's rec's. Referral entered for diabetic education. Pt given contact info to schedule appt with diabetic educator ASAP. Pt agreed and voiced understanding.

## 2018-11-14 ENCOUNTER — TELEPHONE (OUTPATIENT)
Dept: OBGYN CLINIC | Facility: CLINIC | Age: 38
End: 2018-11-14

## 2018-11-14 ENCOUNTER — DIABETIC EDUCATION (OUTPATIENT)
Dept: ENDOCRINOLOGY CLINIC | Facility: CLINIC | Age: 38
End: 2018-11-14

## 2018-11-14 DIAGNOSIS — O24.410 DIET CONTROLLED GESTATIONAL DIABETES MELLITUS (GDM) IN THIRD TRIMESTER: Primary | ICD-10-CM

## 2018-11-14 PROCEDURE — G0108 DIAB MANAGE TRN  PER INDIV: HCPCS | Performed by: DIETITIAN, REGISTERED

## 2018-11-14 RX ORDER — LANCETS 33 GAUGE
1 EACH MISCELLANEOUS 4 TIMES DAILY
Qty: 1 BOX | Refills: 0 | Status: SHIPPED | OUTPATIENT
Start: 2018-11-14 | End: 2019-01-15

## 2018-11-14 RX ORDER — BLOOD SUGAR DIAGNOSTIC
STRIP MISCELLANEOUS
Qty: 200 STRIP | Refills: 1 | Status: ON HOLD | OUTPATIENT
Start: 2018-11-14 | End: 2018-12-05

## 2018-11-14 NOTE — PROGRESS NOTES
[address] [place] [address]  DMJ: was seen for Gestational Diabetes Counseling: Individual/Group    Date: 2018   Start time: 9:30 End time: 11:00    Assessment:LMP 2018 (Approximate)     : 2  Para: 1  KELLEY: 12/15/18    GDM Screen: 3 OGTT Referri willing to change discussed. Training Tools Provided:  [facility] Diabetes and Pregnancy: A guide to self management  BG Log Sheets    Recommendations:      1. Follow recommended meal plan.    2. Begin testing fasting glucose and 2 hour after meals

## 2018-11-14 NOTE — TELEPHONE ENCOUNTER
Patient is 35 weeks pregnant and is experiencing constipation would like to know if she can take stool softener and if so what kind

## 2018-11-14 NOTE — TELEPHONE ENCOUNTER
Spoke with pt and advised she can take Colace twice daily. Pt advised she can also try prune juice, increase fluid intake, and increase fiber in diet. Pt agreed and voiced understanding.

## 2018-11-20 ENCOUNTER — HOSPITAL ENCOUNTER (OUTPATIENT)
Dept: PERINATAL CARE | Facility: HOSPITAL | Age: 38
Discharge: HOME | End: 2018-11-20
Attending: ADVANCED PRACTICE MIDWIFE
Payer: COMMERCIAL

## 2018-11-20 ENCOUNTER — ROUTINE PRENATAL (OUTPATIENT)
Dept: OBGYN CLINIC | Facility: CLINIC | Age: 38
End: 2018-11-20

## 2018-11-20 VITALS — HEART RATE: 72 BPM | SYSTOLIC BLOOD PRESSURE: 108 MMHG | DIASTOLIC BLOOD PRESSURE: 62 MMHG

## 2018-11-20 VITALS
HEART RATE: 90 BPM | SYSTOLIC BLOOD PRESSURE: 107 MMHG | BODY MASS INDEX: 30 KG/M2 | WEIGHT: 170.63 LBS | DIASTOLIC BLOOD PRESSURE: 70 MMHG

## 2018-11-20 DIAGNOSIS — Z34.83 ENCOUNTER FOR SUPERVISION OF OTHER NORMAL PREGNANCY IN THIRD TRIMESTER: Primary | ICD-10-CM

## 2018-11-20 DIAGNOSIS — O09.523 MULTIGRAVIDA OF ADVANCED MATERNAL AGE IN THIRD TRIMESTER: ICD-10-CM

## 2018-11-20 DIAGNOSIS — O09.529 AMA (ADVANCED MATERNAL AGE) MULTIGRAVIDA 35+: Primary | ICD-10-CM

## 2018-11-20 PROCEDURE — 59025 FETAL NON-STRESS TEST: CPT | Performed by: ADVANCED PRACTICE MIDWIFE

## 2018-11-20 PROCEDURE — 81002 URINALYSIS NONAUTO W/O SCOPE: CPT | Performed by: ADVANCED PRACTICE MIDWIFE

## 2018-11-20 NOTE — NST
Nonstress Test   Patient: Lamont Hood    Gestation: 36w3d    NST: AMA       Variability: Moderate           Accelerations: Yes           Decelerations: None            Baseline: 130 BPM           Uterine Irritability: No           Contractions: Not present

## 2018-11-20 NOTE — PROGRESS NOTES
Pt reports feeling well. +FM. Has been checking blood sugars for about last week but forgot to bring log. Reports having elevations in PP sugars after eating pizza or drinking juice.   Also noted glucosuria and when questioned pt states she ate [place] [employer]'

## 2018-11-21 NOTE — ADDENDUM NOTE
Encounter addended by:  Arvind Carrasquillo CNM on: 11/21/2018 9:35 AM   Actions taken: Sign clinical note, Visit diagnoses modified, Charge Capture section accepted

## 2018-11-27 ENCOUNTER — HOSPITAL ENCOUNTER (OUTPATIENT)
Dept: PERINATAL CARE | Facility: HOSPITAL | Age: 38
Discharge: HOME | End: 2018-11-27
Attending: ADVANCED PRACTICE MIDWIFE
Payer: COMMERCIAL

## 2018-11-27 ENCOUNTER — ROUTINE PRENATAL (OUTPATIENT)
Dept: OBGYN CLINIC | Facility: CLINIC | Age: 38
End: 2018-11-27

## 2018-11-27 VITALS
HEART RATE: 80 BPM | BODY MASS INDEX: 30.32 KG/M2 | HEIGHT: 63 IN | SYSTOLIC BLOOD PRESSURE: 108 MMHG | WEIGHT: 171.13 LBS | DIASTOLIC BLOOD PRESSURE: 70 MMHG

## 2018-11-27 VITALS — DIASTOLIC BLOOD PRESSURE: 60 MMHG | SYSTOLIC BLOOD PRESSURE: 99 MMHG

## 2018-11-27 DIAGNOSIS — O09.523 AMA (ADVANCED MATERNAL AGE) MULTIGRAVIDA 35+, THIRD TRIMESTER: Primary | ICD-10-CM

## 2018-11-27 DIAGNOSIS — O24.410 DIET CONTROLLED GESTATIONAL DIABETES MELLITUS (GDM) IN THIRD TRIMESTER: ICD-10-CM

## 2018-11-27 DIAGNOSIS — Z34.83 PRENATAL CARE, SUBSEQUENT PREGNANCY, THIRD TRIMESTER: Primary | ICD-10-CM

## 2018-11-27 PROBLEM — Z28.21 REFUSES TETANUS, DIPHTHERIA, AND ACELLULAR PERTUSSIS (TDAP) VACCINATION: Status: ACTIVE | Noted: 2018-11-27

## 2018-11-27 PROCEDURE — 59025 FETAL NON-STRESS TEST: CPT | Performed by: ADVANCED PRACTICE MIDWIFE

## 2018-11-27 PROCEDURE — 81002 URINALYSIS NONAUTO W/O SCOPE: CPT | Performed by: ADVANCED PRACTICE MIDWIFE

## 2018-11-27 NOTE — PROGRESS NOTES
Doing well, no complaints. Rev blood sugars - 1 elevated PP in last week, missing several after-dinner checks due to travel/being out. Encouraged to continue checking after dinner. Rev warnings/when to call.   NST reactive today  Requesting Preservative

## 2018-11-27 NOTE — NST
Nonstress Test   Patient: Lamont Hood    Gestation: 37w3d    NST: ama gdm       Variability: Moderate           Accelerations: Yes           Decelerations: None            Baseline: 120 BPM           Uterine Irritability: No           Contractions: Not pre

## 2018-11-28 NOTE — ADDENDUM NOTE
Encounter addended by:  John Paul Kendrick CNM on: 11/28/2018 9:46 AM   Actions taken: Charge Capture section accepted, Sign clinical note

## 2018-11-29 ENCOUNTER — DIABETIC EDUCATION (OUTPATIENT)
Dept: ENDOCRINOLOGY CLINIC | Facility: CLINIC | Age: 38
End: 2018-11-29

## 2018-11-29 DIAGNOSIS — O24.410 DIET CONTROLLED GESTATIONAL DIABETES MELLITUS (GDM) IN THIRD TRIMESTER: Primary | ICD-10-CM

## 2018-11-29 PROCEDURE — 97803 MED NUTRITION INDIV SUBSEQ: CPT | Performed by: DIETITIAN, REGISTERED

## 2018-11-29 NOTE — PROGRESS NOTES
Start time: 9:40  End time: 9:55    Pt here for GDM f/u. FBS's 82, 71, 95    Two hours pp 120, 101, 113. Occasionally 131 if she overate starch    No exercise. No symptoms of hypoglycemia.     [facility] 12/15/18    Reviewed diet:   Breakfast 0:89 1 slice w

## 2018-12-03 ENCOUNTER — HOSPITAL ENCOUNTER (INPATIENT)
Facility: HOSPITAL | Age: 38
LOS: 2 days | Discharge: HOME | End: 2018-12-05
Attending: ADVANCED PRACTICE MIDWIFE | Admitting: OBSTETRICS & GYNECOLOGY
Payer: COMMERCIAL

## 2018-12-03 PROBLEM — Z34.90 PREGNANCY: Status: ACTIVE | Noted: 2018-12-03

## 2018-12-03 PROBLEM — Z34.90 PREGNANCY (HCC): Status: ACTIVE | Noted: 2018-12-03

## 2018-12-03 PROCEDURE — 10907ZC DRAINAGE OF AMNIOTIC FLUID, THERAPEUTIC FROM PRODUCTS OF CONCEPTION, VIA NATURAL OR ARTIFICIAL OPENING: ICD-10-PCS | Performed by: ADVANCED PRACTICE MIDWIFE

## 2018-12-03 PROCEDURE — 0HQ9XZZ REPAIR PERINEUM SKIN, EXTERNAL APPROACH: ICD-10-PCS | Performed by: ADVANCED PRACTICE MIDWIFE

## 2018-12-03 PROCEDURE — 85027 COMPLETE CBC AUTOMATED: CPT | Performed by: ADVANCED PRACTICE MIDWIFE

## 2018-12-03 PROCEDURE — A4216 STERILE WATER/SALINE, 10 ML: HCPCS

## 2018-12-03 PROCEDURE — 86901 BLOOD TYPING SEROLOGIC RH(D): CPT | Performed by: ADVANCED PRACTICE MIDWIFE

## 2018-12-03 PROCEDURE — 99214 OFFICE O/P EST MOD 30 MIN: CPT

## 2018-12-03 PROCEDURE — 82962 GLUCOSE BLOOD TEST: CPT

## 2018-12-03 PROCEDURE — 86850 RBC ANTIBODY SCREEN: CPT | Performed by: ADVANCED PRACTICE MIDWIFE

## 2018-12-03 PROCEDURE — 86900 BLOOD TYPING SEROLOGIC ABO: CPT | Performed by: ADVANCED PRACTICE MIDWIFE

## 2018-12-03 RX ORDER — IBUPROFEN 600 MG/1
600 TABLET ORAL ONCE AS NEEDED
Status: DISCONTINUED | OUTPATIENT
Start: 2018-12-03 | End: 2018-12-03

## 2018-12-03 RX ORDER — AMMONIA INHALANTS 0.04 G/.3ML
0.3 INHALANT RESPIRATORY (INHALATION) AS NEEDED
Status: DISCONTINUED | OUTPATIENT
Start: 2018-12-03 | End: 2018-12-05

## 2018-12-03 RX ORDER — DIAPER,BRIEF,INFANT-TODD,DISP
1 EACH MISCELLANEOUS EVERY 6 HOURS PRN
Status: DISCONTINUED | OUTPATIENT
Start: 2018-12-03 | End: 2018-12-05

## 2018-12-03 RX ORDER — IBUPROFEN 400 MG/1
400 TABLET ORAL EVERY 4 HOURS PRN
Status: DISCONTINUED | OUTPATIENT
Start: 2018-12-03 | End: 2018-12-05

## 2018-12-03 RX ORDER — AMMONIA INHALANTS 0.04 G/.3ML
0.3 INHALANT RESPIRATORY (INHALATION) AS NEEDED
Status: DISCONTINUED | OUTPATIENT
Start: 2018-12-03 | End: 2018-12-03

## 2018-12-03 RX ORDER — TERBUTALINE SULFATE 1 MG/ML
0.25 INJECTION, SOLUTION SUBCUTANEOUS AS NEEDED
Status: DISCONTINUED | OUTPATIENT
Start: 2018-12-03 | End: 2018-12-03

## 2018-12-03 RX ORDER — DOCUSATE SODIUM 100 MG/1
100 CAPSULE, LIQUID FILLED ORAL 2 TIMES DAILY
Status: DISCONTINUED | OUTPATIENT
Start: 2018-12-03 | End: 2018-12-05

## 2018-12-03 RX ORDER — BISACODYL 10 MG
10 SUPPOSITORY, RECTAL RECTAL ONCE AS NEEDED
Status: DISCONTINUED | OUTPATIENT
Start: 2018-12-03 | End: 2018-12-05

## 2018-12-03 RX ORDER — LIDOCAINE HYDROCHLORIDE 10 MG/ML
30 INJECTION, SOLUTION EPIDURAL; INFILTRATION; INTRACAUDAL; PERINEURAL ONCE
Status: COMPLETED | OUTPATIENT
Start: 2018-12-03 | End: 2018-12-03

## 2018-12-03 RX ORDER — PRENATAL VIT,CAL 76/IRON/FOLIC 29 MG-1 MG
1 TABLET ORAL DAILY
Status: DISCONTINUED | OUTPATIENT
Start: 2018-12-03 | End: 2018-12-05

## 2018-12-03 RX ORDER — IBUPROFEN 200 MG
200 TABLET ORAL EVERY 4 HOURS PRN
Status: DISCONTINUED | OUTPATIENT
Start: 2018-12-03 | End: 2018-12-05

## 2018-12-03 RX ORDER — IBUPROFEN 600 MG/1
600 TABLET ORAL EVERY 6 HOURS
Status: DISCONTINUED | OUTPATIENT
Start: 2018-12-03 | End: 2018-12-05

## 2018-12-03 RX ORDER — 0.9 % SODIUM CHLORIDE 0.9 %
VIAL (ML) INJECTION
Status: DISPENSED
Start: 2018-12-03 | End: 2018-12-03

## 2018-12-03 RX ORDER — ONDANSETRON 2 MG/ML
4 INJECTION INTRAMUSCULAR; INTRAVENOUS EVERY 6 HOURS PRN
Status: DISCONTINUED | OUTPATIENT
Start: 2018-12-03 | End: 2018-12-05

## 2018-12-03 RX ORDER — SODIUM CHLORIDE 0.9 % (FLUSH) 0.9 %
10 SYRINGE (ML) INJECTION AS NEEDED
Status: DISCONTINUED | OUTPATIENT
Start: 2018-12-03 | End: 2018-12-03

## 2018-12-03 RX ORDER — IBUPROFEN 600 MG/1
600 TABLET ORAL EVERY 4 HOURS PRN
Status: DISCONTINUED | OUTPATIENT
Start: 2018-12-03 | End: 2018-12-05

## 2018-12-03 RX ORDER — TRISODIUM CITRATE DIHYDRATE AND CITRIC ACID MONOHYDRATE 500; 334 MG/5ML; MG/5ML
30 SOLUTION ORAL AS NEEDED
Status: DISCONTINUED | OUTPATIENT
Start: 2018-12-03 | End: 2018-12-03

## 2018-12-03 RX ORDER — SIMETHICONE 80 MG
80 TABLET,CHEWABLE ORAL 3 TIMES DAILY PRN
Status: DISCONTINUED | OUTPATIENT
Start: 2018-12-03 | End: 2018-12-05

## 2018-12-03 NOTE — PROGRESS NOTES
Pt is a 41year old female admitted to TR1/TR1-A. Patient presents with:  Contractions     Pt is  38w2d intra-uterine pregnancy. History obtained, consents signed. Oriented to room, staff, and plan of care.

## 2018-12-03 NOTE — PLAN OF CARE
Received patient into room 365. Bedside shift report received from Dafne DEL CID  Bed in locked and low position. Side rails up X2. Vital signs stable, fundus firm at U/U, lochia small, no clots noted. IV LFA site unremarkable. Pain level 0/10.   Baby boy p

## 2018-12-03 NOTE — PROGRESS NOTES
[facility]      Labor Progress Note    175 [facility] Patient Status:  Inpatient    1980 MRN [id number]   Location [address]  Attending Ariel Fisher, [address] [facility] Day # 0 PCP Na Beck, DO      SUBJECT

## 2018-12-03 NOTE — PROGRESS NOTES
Patient up to bathroom with assist x 2. Voided 550. Patient transferred to mother/baby room 365 per wheelchair in stable condition with baby and personal belongings. Accompanied by significant other and staff. Report given to mother/baby [facility].

## 2018-12-03 NOTE — L&D DELIVERY NOTE
[place] [facility] - [facility]    Vaginal Delivery Note    [address] [facility] Patient Status:  Inpatient    1980 MRN [id number]   Location [address] Attending Idalia Mcrae, [address] Day # 0 PCP Prosper Sandhu DO     Delivery

## 2018-12-03 NOTE — LACTATION NOTE
LACTATION NOTE - MOTHER      Evaluation Type: Inpatient    Problems identified  Problems identified: Knowledge deficit    Maternal history  Maternal history: Gestational diabetes; AMA    Breastfeeding goal  Breastfeeding goal: To maintain breast milk feedin

## 2018-12-03 NOTE — H&P
[place] [facility] - [facility]    History & Physical    Lamont Naseem Padilla Patient Status:  Inpatient    1980 MRN [id number]   Location [address] Attending Cornelius Haywood, [address] Day # 0 PCP Johan Cano,      Date of Adm Smokeless tobacco: Never Used    Alcohol use: No      Home Meds:   Medications Prior to Admission:  prenatal multivitamin plus DHA 27-0.8-228 MG Oral Cap Take 1 capsule by mouth daily.  Disp:  Rfl:  12/2/2018 at Unknown time   [facility] LANCETS 95J Do 7.6  12/15/18 Same 32w6d Doyle Dotson CNM 10/26/18   Alternate KELLEY Entry   0 Comment: Date entered prior to episode creation  12/03/18 +1w5d  Sindy Jaeger RN 04/23/18            ASSESSMENT:    38 and 2/7 weeks gestation.   Active phase labor

## 2018-12-04 PROCEDURE — 85025 COMPLETE CBC W/AUTO DIFF WBC: CPT | Performed by: ADVANCED PRACTICE MIDWIFE

## 2018-12-04 NOTE — LACTATION NOTE
LACTATION NOTE - MOTHER      Evaluation Type: Inpatient    Problems identified  Problems identified: Knowledge deficit; Nipple pain         Breastfeeding goal  Breastfeeding goal: To maintain breast milk feeding per patient goal    Maternal Assessment  Bila

## 2018-12-04 NOTE — PROGRESS NOTES
[place] [facility] - [facility]    OB/GYNE Progress Note      [address] [facility] [address] Patient Status:  Inpatient    1980 MRN [id number]   Location [facility] 3SE Attending Munira Chavez, [address] Day # 1 PCP Dafne Alcazar,         Assessment/Plan Negative 11/07/2018    HBVSAG Nonreactive  05/18/2018    ABO A 12/03/2018    RH Positive 12/03/2018    WBC 7.9 12/04/2018    HGB 10.7 (L) 12/04/2018    HCT 32.4 (L) 12/04/2018     12/04/2018    CREATSERUM 0.87 03/28/2018    BUN 12 03/28/2018    NA 1

## 2018-12-05 VITALS
TEMPERATURE: 98 F | HEART RATE: 87 BPM | RESPIRATION RATE: 16 BRPM | DIASTOLIC BLOOD PRESSURE: 72 MMHG | SYSTOLIC BLOOD PRESSURE: 113 MMHG

## 2018-12-05 NOTE — PROGRESS NOTES
[place] [facility] - [facility]    OB/GYNE Progress Note      [address] Patient Status:  Inpatient    1980 MRN [id number]   Location [facility] 3SE Attending Aruna Kianna, [address] Day # 2 PCP Domitila Otoole, DO        Assessment/Plan K 3.6 03/28/2018     03/28/2018    CO2 28 03/28/2018    GLU 87 03/28/2018    CA 9.3 03/28/2018    ALB 4.6 03/28/2018    ALKPHO 70 03/28/2018    BILT 0.8 03/28/2018    TP 7.4 03/28/2018    AST 18 03/28/2018    ALT 16 03/28/2018    TSH 1.08 05/18/2018

## 2018-12-05 NOTE — LACTATION NOTE
LACTATION NOTE - MOTHER      Evaluation Type: Inpatient    Problems identified  Problems identified: Knowledge deficit; Nipple pain;Milk supply WNL    Maternal history  Maternal history: Gestational diabetes  Other/comment: fibroids    Breastfeeding goal  B

## 2018-12-05 NOTE — LACTATION NOTE
This note was copied from a baby's chart.   LACTATION NOTE - INFANT    Evaluation Type  Evaluation Type: Inpatient    Problems & Assessment  Problems Diagnosed or Identified: Disorganized suck  Problems: comment/detail: Suck weak with pinching and little ja

## 2018-12-05 NOTE — DISCHARGE SUMMARY
[place] [facility] - [facility]    Discharge Summary    [address] [facility] [address] Patient Status:  Inpatient    1980 MRN [id number]   Location [facility] 3SE Attending Aruna Kianna, [address] Day # 2       Delivering OB Clinician:  Karen Barba CNM    EDC:

## 2018-12-10 ENCOUNTER — TELEPHONE (OUTPATIENT)
Dept: OBGYN CLINIC | Facility: CLINIC | Age: 38
End: 2018-12-10

## 2018-12-10 RX ORDER — BREAST PUMP
EACH MISCELLANEOUS
Qty: 1 EACH | Refills: 0 | Status: SHIPPED | OUTPATIENT
Start: 2018-12-10 | End: 2021-11-23

## 2018-12-10 NOTE — TELEPHONE ENCOUNTER
Spoke with pt and advised order for breast pump has been entered and faxed to [address] [place] [address] . Pt agreed and voiced understanding.

## 2019-01-04 ENCOUNTER — TELEPHONE (OUTPATIENT)
Dept: OBGYN CLINIC | Facility: CLINIC | Age: 39
End: 2019-01-04

## 2019-01-10 ENCOUNTER — TELEPHONE (OUTPATIENT)
Dept: OBGYN CLINIC | Facility: CLINIC | Age: 39
End: 2019-01-10

## 2019-01-10 NOTE — TELEPHONE ENCOUNTER
Attempted to call again. On hold x35 mins. Did not go to . We did not receive paperwork from [facility] regarding referral, but DME referral to [facility] authorized.

## 2019-01-10 NOTE — TELEPHONE ENCOUNTER
ACE CALLING FORM [facility] / CALLING TO CONFIRMED THAT THE FORM WAS RECEIVED FOR A PRIOR AUTHORIZATION FOR BREAST PUMP / PLS ADV

## 2019-01-15 ENCOUNTER — POSTPARTUM (OUTPATIENT)
Dept: OBGYN CLINIC | Facility: CLINIC | Age: 39
End: 2019-01-15

## 2019-01-15 VITALS
WEIGHT: 157 LBS | HEART RATE: 81 BPM | DIASTOLIC BLOOD PRESSURE: 80 MMHG | BODY MASS INDEX: 28 KG/M2 | SYSTOLIC BLOOD PRESSURE: 96 MMHG

## 2019-01-15 DIAGNOSIS — Z86.32 HISTORY OF DIET CONTROLLED GESTATIONAL DIABETES MELLITUS (GDM): ICD-10-CM

## 2019-01-15 NOTE — PROGRESS NOTES
Patient here for postpartum check-up. spontaneous vaginal delivery 12/3/2018  with Denyce Shelling     Breastfeeding and supplementing with a little formula. Baby with adequate weight gain. Denies fevers, chills, body aches and flu-like symptoms.   Nona Pagan LEAVITT and ovulation before start menses  2hr GTT ordered  F/u next available for pap.

## 2019-01-16 ENCOUNTER — TELEPHONE (OUTPATIENT)
Dept: OBGYN CLINIC | Facility: CLINIC | Age: 39
End: 2019-01-16

## 2019-01-16 NOTE — TELEPHONE ENCOUNTER
Attempted to contact [facility] x's 2. Was on hold for 10 min and 15 min. Will try again. We did receive a breast pump order from them that was signed and faxed back on 1/4/19. We have not received a recent fax from them.   Breast pump order and referral

## 2019-01-17 NOTE — TELEPHONE ENCOUNTER
Attempted to call [facility], was on hold for 10 min. Authorized breast pump referral was faxed to [facility] on 1/16.

## 2019-02-14 ENCOUNTER — TELEPHONE (OUTPATIENT)
Dept: OBGYN CLINIC | Facility: CLINIC | Age: 39
End: 2019-02-14

## 2019-02-15 NOTE — TELEPHONE ENCOUNTER
Advised pt of overdue 2hr gtt. Pt states it is hard for her to leave the house with her baby since she does not have anyone to care for the baby.  Pt would like to know if instead of doing 2hr gtt she can check her blood sugar levels at home for a few weeks

## 2019-02-15 NOTE — TELEPHONE ENCOUNTER
Well, if she is unable to come for blood draw, that is fine. It will tell us if her blood sugar is controlled with her meals and fasting.  The next time she is seen by either us or her PCP, we can have her to a HgB A1c which will give us an idea as to her

## 2019-03-15 NOTE — TELEPHONE ENCOUNTER
Spoke with pt who reports she has been checking blood sugar levels twice a day for the past 2 weeks. Pt reports all her fasting blood sugars have been less than 95 and 2hr postprandial have been less than 110. Message sent to Christiano Carlson as annabelle.

## 2019-06-04 ENCOUNTER — TELEPHONE (OUTPATIENT)
Dept: CASE MANAGEMENT | Age: 39
End: 2019-06-04

## 2019-07-19 ENCOUNTER — OFFICE VISIT (OUTPATIENT)
Dept: FAMILY MEDICINE CLINIC | Facility: CLINIC | Age: 39
End: 2019-07-19

## 2019-07-19 VITALS
HEIGHT: 63 IN | SYSTOLIC BLOOD PRESSURE: 104 MMHG | WEIGHT: 140 LBS | BODY MASS INDEX: 24.8 KG/M2 | TEMPERATURE: 98 F | DIASTOLIC BLOOD PRESSURE: 62 MMHG | HEART RATE: 72 BPM

## 2019-07-19 DIAGNOSIS — Z00.00 WELL ADULT EXAM: ICD-10-CM

## 2019-07-19 DIAGNOSIS — Z00.00 ANNUAL PHYSICAL EXAM: Primary | ICD-10-CM

## 2019-07-19 PROCEDURE — 99395 PREV VISIT EST AGE 18-39: CPT | Performed by: PHYSICIAN ASSISTANT

## 2019-07-19 NOTE — PROGRESS NOTES
HPI:    Patient ID: Karen Ruiz is a 42year old female. Patient is here for routine physical exam. No acute issues. She has two children. No significant chronic medical problems. Still taking pre-jose vitamins. Patient is requesting testing.  Diet and exer well-developed and well-nourished. HENT:   Head: Normocephalic.    Right Ear: Tympanic membrane and ear canal normal.   Left Ear: Tympanic membrane and ear canal normal.   Nose: Nose normal.   Mouth/Throat: Oropharynx is clear and moist.   Eyes: Pupils ar Visit:  Requested Prescriptions      No prescriptions requested or ordered in this encounter       Imaging & Referrals:  None         ID#[id number]

## 2019-07-24 LAB
LAST PAP RESULT: NORMAL
PAP HISTORY (OTHER THAN LAST PAP): NORMAL

## 2019-08-21 ENCOUNTER — LAB ENCOUNTER (OUTPATIENT)
Dept: LAB | Facility: HOSPITAL | Age: 39
End: 2019-08-21
Attending: PHYSICIAN ASSISTANT
Payer: COMMERCIAL

## 2019-08-21 DIAGNOSIS — Z00.00 ANNUAL PHYSICAL EXAM: ICD-10-CM

## 2019-08-21 LAB
ALBUMIN SERPL-MCNC: 3.9 G/DL (ref 3.4–5)
ALBUMIN/GLOB SERPL: 1.1 {RATIO} (ref 1–2)
ALP LIVER SERPL-CCNC: 79 U/L (ref 37–98)
ALT SERPL-CCNC: 20 U/L (ref 13–56)
ANION GAP SERPL CALC-SCNC: 6 MMOL/L (ref 0–18)
AST SERPL-CCNC: 8 U/L (ref 15–37)
BASOPHILS # BLD AUTO: 0.09 X10(3) UL (ref 0–0.2)
BASOPHILS NFR BLD AUTO: 1.8 %
BILIRUB SERPL-MCNC: 0.5 MG/DL (ref 0.1–2)
BILIRUB UR QL STRIP.AUTO: NEGATIVE
BUN BLD-MCNC: 14 MG/DL (ref 7–18)
BUN/CREAT SERPL: 18.2 (ref 10–20)
CALCIUM BLD-MCNC: 9 MG/DL (ref 8.5–10.1)
CHLORIDE SERPL-SCNC: 108 MMOL/L (ref 98–112)
CHOLEST SMN-MCNC: 191 MG/DL (ref ?–200)
CLARITY UR REFRACT.AUTO: CLEAR
CO2 SERPL-SCNC: 29 MMOL/L (ref 21–32)
COLOR UR AUTO: YELLOW
CREAT BLD-MCNC: 0.77 MG/DL (ref 0.55–1.02)
DEPRECATED RDW RBC AUTO: 47.8 FL (ref 35.1–46.3)
EOSINOPHIL # BLD AUTO: 0.32 X10(3) UL (ref 0–0.7)
EOSINOPHIL NFR BLD AUTO: 6.3 %
ERYTHROCYTE [DISTWIDTH] IN BLOOD BY AUTOMATED COUNT: 14 % (ref 11–15)
GLOBULIN PLAS-MCNC: 3.5 G/DL (ref 2.8–4.4)
GLUCOSE BLD-MCNC: 85 MG/DL (ref 70–99)
GLUCOSE UR STRIP.AUTO-MCNC: NEGATIVE MG/DL
HCT VFR BLD AUTO: 39.9 % (ref 35–48)
HDLC SERPL-MCNC: 72 MG/DL (ref 40–59)
HGB BLD-MCNC: 13 G/DL (ref 12–16)
IMM GRANULOCYTES # BLD AUTO: 0.01 X10(3) UL (ref 0–1)
IMM GRANULOCYTES NFR BLD: 0.2 %
KETONES UR STRIP.AUTO-MCNC: NEGATIVE MG/DL
LDLC SERPL CALC-MCNC: 107 MG/DL (ref ?–100)
LYMPHOCYTES # BLD AUTO: 1.86 X10(3) UL (ref 1–4)
LYMPHOCYTES NFR BLD AUTO: 36.5 %
M PROTEIN MFR SERPL ELPH: 7.4 G/DL (ref 6.4–8.2)
MCH RBC QN AUTO: 30.4 PG (ref 26–34)
MCHC RBC AUTO-ENTMCNC: 32.6 G/DL (ref 31–37)
MCV RBC AUTO: 93.4 FL (ref 80–100)
MONOCYTES # BLD AUTO: 0.39 X10(3) UL (ref 0.1–1)
MONOCYTES NFR BLD AUTO: 7.7 %
NEUTROPHILS # BLD AUTO: 2.42 X10 (3) UL (ref 1.5–7.7)
NEUTROPHILS # BLD AUTO: 2.42 X10(3) UL (ref 1.5–7.7)
NEUTROPHILS NFR BLD AUTO: 47.5 %
NITRITE UR QL STRIP.AUTO: NEGATIVE
NONHDLC SERPL-MCNC: 119 MG/DL (ref ?–130)
OSMOLALITY SERPL CALC.SUM OF ELEC: 296 MOSM/KG (ref 275–295)
PH UR STRIP.AUTO: 5 [PH] (ref 4.5–8)
PLATELET # BLD AUTO: 298 10(3)UL (ref 150–450)
POTASSIUM SERPL-SCNC: 4 MMOL/L (ref 3.5–5.1)
PROT UR STRIP.AUTO-MCNC: NEGATIVE MG/DL
RBC # BLD AUTO: 4.27 X10(6)UL (ref 3.8–5.3)
RBC UR QL AUTO: NEGATIVE
SODIUM SERPL-SCNC: 143 MMOL/L (ref 136–145)
SP GR UR STRIP.AUTO: 1.02 (ref 1–1.03)
TRIGL SERPL-MCNC: 60 MG/DL (ref 30–149)
TSI SER-ACNC: 3.37 MIU/ML (ref 0.36–3.74)
UROBILINOGEN UR STRIP.AUTO-MCNC: <2 MG/DL
VLDLC SERPL CALC-MCNC: 12 MG/DL (ref 0–30)
WBC # BLD AUTO: 5.1 X10(3) UL (ref 4–11)

## 2019-08-21 PROCEDURE — 85025 COMPLETE CBC W/AUTO DIFF WBC: CPT

## 2019-08-21 PROCEDURE — 80053 COMPREHEN METABOLIC PANEL: CPT

## 2019-08-21 PROCEDURE — 36415 COLL VENOUS BLD VENIPUNCTURE: CPT

## 2019-08-21 PROCEDURE — 81001 URINALYSIS AUTO W/SCOPE: CPT

## 2019-08-21 PROCEDURE — 80061 LIPID PANEL: CPT

## 2019-08-21 PROCEDURE — 84443 ASSAY THYROID STIM HORMONE: CPT

## 2021-11-23 ENCOUNTER — OFFICE VISIT (OUTPATIENT)
Dept: FAMILY MEDICINE CLINIC | Facility: CLINIC | Age: 41
End: 2021-11-23
Payer: COMMERCIAL

## 2021-11-23 VITALS
HEART RATE: 95 BPM | TEMPERATURE: 97 F | WEIGHT: 148 LBS | SYSTOLIC BLOOD PRESSURE: 102 MMHG | HEIGHT: 63.39 IN | RESPIRATION RATE: 16 BRPM | DIASTOLIC BLOOD PRESSURE: 58 MMHG | OXYGEN SATURATION: 98 % | BODY MASS INDEX: 25.9 KG/M2

## 2021-11-23 DIAGNOSIS — Z00.00 WELL WOMAN EXAM WITHOUT GYNECOLOGICAL EXAM: Primary | ICD-10-CM

## 2021-11-23 DIAGNOSIS — L81.9 CHANGE IN PIGMENTED SKIN LESION OF FACE: ICD-10-CM

## 2021-11-23 DIAGNOSIS — M53.3 COCCYDYNIA: ICD-10-CM

## 2021-11-23 DIAGNOSIS — K64.4 EXTERNAL HEMORRHOID: ICD-10-CM

## 2021-11-23 PROCEDURE — 3008F BODY MASS INDEX DOCD: CPT | Performed by: FAMILY MEDICINE

## 2021-11-23 PROCEDURE — 99385 PREV VISIT NEW AGE 18-39: CPT | Performed by: FAMILY MEDICINE

## 2021-11-23 PROCEDURE — 85025 COMPLETE CBC W/AUTO DIFF WBC: CPT | Performed by: FAMILY MEDICINE

## 2021-11-23 PROCEDURE — 3078F DIAST BP <80 MM HG: CPT | Performed by: FAMILY MEDICINE

## 2021-11-23 PROCEDURE — 80061 LIPID PANEL: CPT | Performed by: FAMILY MEDICINE

## 2021-11-23 PROCEDURE — 84443 ASSAY THYROID STIM HORMONE: CPT | Performed by: FAMILY MEDICINE

## 2021-11-23 PROCEDURE — 3074F SYST BP LT 130 MM HG: CPT | Performed by: FAMILY MEDICINE

## 2021-11-23 PROCEDURE — 80053 COMPREHEN METABOLIC PANEL: CPT | Performed by: FAMILY MEDICINE

## 2021-11-23 NOTE — PROGRESS NOTES
SUBJECTIVE:  Patient presents with:  Establish Care: New Patient   Physical: WWE No Pap  Hemorrhoids: xmonths, no pain  Injury: Fall, Pain to the Tailbone  Derm Problem: Skin Check for Milia    HPI:  · Hemorrhoids: Denies pain. Small bump in rectal area. above    HISTORY:  Past Medical History:   Diagnosis Date   • Amenorrhea     Irregular menses   • Diet controlled gestational diabetes mellitus (GDM) in third trimester 11/13/2018   • Fibroids 2016    Seen on OB US   • History of chicken pox     age 8 Hemorrhoids (xmonths, no pain), Injury (Fall, Pain to the Tailbone), and Derm Problem (Skin Check for Milia)    Problem List Items Addressed This Visit     None      Visit Diagnoses     Well woman exam without gynecological exam    -  Primary    Relevant O

## 2021-11-26 DIAGNOSIS — D72.819 LEUKOPENIA, UNSPECIFIED TYPE: Primary | ICD-10-CM

## 2022-10-25 ENCOUNTER — TELEPHONE (OUTPATIENT)
Dept: FAMILY MEDICINE CLINIC | Facility: CLINIC | Age: 42
End: 2022-10-25

## 2022-10-25 DIAGNOSIS — Z00.00 ROUTINE HEALTH MAINTENANCE: Primary | ICD-10-CM

## 2022-10-25 NOTE — TELEPHONE ENCOUNTER
Please enter lab orders for the patient's upcoming physical appointment. Physical scheduled: Your appointments     Date & Time Appointment Department [facility])    Dec 12, 2022  9:00 AM CST Adult Physical with Stu DavisDO [address] [facility]  ([address])    PLEASE NOTE - Most insurances allow a Complete Physical once every 366 days. Please schedule accordingly. Please arrive 15 minutes prior to your scheduled appointment. Please also bring your Insurance card, Photo ID, and your medication bottles or a list of your current medication. If you no longer require this appointment, please contact your physician office to cancel. Swetha Sims [address] [facility]3] [phone]         Preferred lab: [facility] LAB DAVE HOFFMAN [facility])     The patient has been notified to complete fasting labs prior to their physical appointment.

## 2022-12-08 ENCOUNTER — LAB ENCOUNTER (OUTPATIENT)
Dept: LAB | Age: 42
End: 2022-12-08
Attending: FAMILY MEDICINE
Payer: COMMERCIAL

## 2022-12-08 DIAGNOSIS — Z00.00 ROUTINE HEALTH MAINTENANCE: ICD-10-CM

## 2022-12-08 LAB
ALBUMIN SERPL-MCNC: 3.7 G/DL (ref 3.4–5)
ALBUMIN/GLOB SERPL: 1.3 {RATIO} (ref 1–2)
ALP LIVER SERPL-CCNC: 41 U/L
ALT SERPL-CCNC: 31 U/L
ANION GAP SERPL CALC-SCNC: 4 MMOL/L (ref 0–18)
AST SERPL-CCNC: 14 U/L (ref 15–37)
BASOPHILS # BLD AUTO: 0.06 X10(3) UL (ref 0–0.2)
BASOPHILS NFR BLD AUTO: 1.5 %
BILIRUB SERPL-MCNC: 0.4 MG/DL (ref 0.1–2)
BUN BLD-MCNC: 11 MG/DL (ref 7–18)
CALCIUM BLD-MCNC: 8.5 MG/DL (ref 8.5–10.1)
CHLORIDE SERPL-SCNC: 105 MMOL/L (ref 98–112)
CHOLEST SERPL-MCNC: 159 MG/DL (ref ?–200)
CO2 SERPL-SCNC: 27 MMOL/L (ref 21–32)
CREAT BLD-MCNC: 0.96 MG/DL
EOSINOPHIL # BLD AUTO: 0.1 X10(3) UL (ref 0–0.7)
EOSINOPHIL NFR BLD AUTO: 2.5 %
ERYTHROCYTE [DISTWIDTH] IN BLOOD BY AUTOMATED COUNT: 13.9 %
FASTING PATIENT LIPID ANSWER: YES
FASTING STATUS PATIENT QL REPORTED: YES
GFR SERPLBLD BASED ON 1.73 SQ M-ARVRAT: 77 ML/MIN/1.73M2 (ref 60–?)
GLOBULIN PLAS-MCNC: 2.9 G/DL (ref 2.8–4.4)
GLUCOSE BLD-MCNC: 81 MG/DL (ref 70–99)
HCT VFR BLD AUTO: 38.5 %
HDLC SERPL-MCNC: 58 MG/DL (ref 40–59)
HGB BLD-MCNC: 12.3 G/DL
IMM GRANULOCYTES # BLD AUTO: 0.01 X10(3) UL (ref 0–1)
IMM GRANULOCYTES NFR BLD: 0.2 %
LDLC SERPL CALC-MCNC: 83 MG/DL (ref ?–100)
LYMPHOCYTES # BLD AUTO: 1.19 X10(3) UL (ref 1–4)
LYMPHOCYTES NFR BLD AUTO: 29.5 %
MCH RBC QN AUTO: 30.9 PG (ref 26–34)
MCHC RBC AUTO-ENTMCNC: 31.9 G/DL (ref 31–37)
MCV RBC AUTO: 96.7 FL
MONOCYTES # BLD AUTO: 0.31 X10(3) UL (ref 0.1–1)
MONOCYTES NFR BLD AUTO: 7.7 %
NEUTROPHILS # BLD AUTO: 2.36 X10 (3) UL (ref 1.5–7.7)
NEUTROPHILS # BLD AUTO: 2.36 X10(3) UL (ref 1.5–7.7)
NEUTROPHILS NFR BLD AUTO: 58.6 %
NONHDLC SERPL-MCNC: 101 MG/DL (ref ?–130)
OSMOLALITY SERPL CALC.SUM OF ELEC: 280 MOSM/KG (ref 275–295)
PLATELET # BLD AUTO: 284 10(3)UL (ref 150–450)
POTASSIUM SERPL-SCNC: 3.5 MMOL/L (ref 3.5–5.1)
PROT SERPL-MCNC: 6.6 G/DL (ref 6.4–8.2)
RBC # BLD AUTO: 3.98 X10(6)UL
SODIUM SERPL-SCNC: 136 MMOL/L (ref 136–145)
TRIGL SERPL-MCNC: 96 MG/DL (ref 30–149)
TSI SER-ACNC: 3.11 MIU/ML (ref 0.36–3.74)
VLDLC SERPL CALC-MCNC: 15 MG/DL (ref 0–30)
WBC # BLD AUTO: 4 X10(3) UL (ref 4–11)

## 2022-12-08 PROCEDURE — 80061 LIPID PANEL: CPT | Performed by: FAMILY MEDICINE

## 2022-12-08 PROCEDURE — 80050 GENERAL HEALTH PANEL: CPT | Performed by: FAMILY MEDICINE

## 2022-12-12 ENCOUNTER — OFFICE VISIT (OUTPATIENT)
Dept: FAMILY MEDICINE CLINIC | Facility: CLINIC | Age: 42
End: 2022-12-12
Payer: COMMERCIAL

## 2022-12-12 VITALS
BODY MASS INDEX: 26.31 KG/M2 | OXYGEN SATURATION: 98 % | RESPIRATION RATE: 16 BRPM | SYSTOLIC BLOOD PRESSURE: 120 MMHG | HEIGHT: 64.5 IN | TEMPERATURE: 97 F | HEART RATE: 58 BPM | WEIGHT: 156 LBS | DIASTOLIC BLOOD PRESSURE: 70 MMHG

## 2022-12-12 DIAGNOSIS — Z12.31 VISIT FOR SCREENING MAMMOGRAM: ICD-10-CM

## 2022-12-12 DIAGNOSIS — Z12.4 SCREENING FOR CERVICAL CANCER: ICD-10-CM

## 2022-12-12 DIAGNOSIS — Z01.419 WELL WOMAN EXAM WITH ROUTINE GYNECOLOGICAL EXAM: Primary | ICD-10-CM

## 2022-12-12 PROCEDURE — 3078F DIAST BP <80 MM HG: CPT | Performed by: FAMILY MEDICINE

## 2022-12-12 PROCEDURE — 3008F BODY MASS INDEX DOCD: CPT | Performed by: FAMILY MEDICINE

## 2022-12-12 PROCEDURE — 87624 HPV HI-RISK TYP POOLED RSLT: CPT | Performed by: FAMILY MEDICINE

## 2022-12-12 PROCEDURE — 3074F SYST BP LT 130 MM HG: CPT | Performed by: FAMILY MEDICINE

## 2022-12-12 PROCEDURE — 99396 PREV VISIT EST AGE 40-64: CPT | Performed by: FAMILY MEDICINE

## 2022-12-13 LAB — HPV I/H RISK 1 DNA SPEC QL NAA+PROBE: NEGATIVE

## 2023-01-06 ENCOUNTER — HOSPITAL ENCOUNTER (OUTPATIENT)
Dept: MAMMOGRAPHY | Facility: HOSPITAL | Age: 43
Discharge: HOME | End: 2023-01-06
Attending: FAMILY MEDICINE
Payer: COMMERCIAL

## 2023-01-06 DIAGNOSIS — Z12.31 VISIT FOR SCREENING MAMMOGRAM: ICD-10-CM

## 2023-01-06 PROCEDURE — 77067 SCR MAMMO BI INCL CAD: CPT | Performed by: FAMILY MEDICINE

## 2023-01-06 PROCEDURE — 77063 BREAST TOMOSYNTHESIS BI: CPT | Performed by: FAMILY MEDICINE

## 2023-02-03 ENCOUNTER — HOSPITAL ENCOUNTER (OUTPATIENT)
Dept: MAMMOGRAPHY | Facility: HOSPITAL | Age: 43
Discharge: HOME | End: 2023-02-03
Attending: FAMILY MEDICINE
Payer: COMMERCIAL

## 2023-02-03 DIAGNOSIS — R92.2 INCONCLUSIVE MAMMOGRAM: ICD-10-CM

## 2023-02-03 PROCEDURE — 76642 ULTRASOUND BREAST LIMITED: CPT | Performed by: FAMILY MEDICINE

## 2023-10-30 ENCOUNTER — TELEPHONE (OUTPATIENT)
Dept: FAMILY MEDICINE CLINIC | Facility: CLINIC | Age: 43
End: 2023-10-30

## 2023-10-30 DIAGNOSIS — Z13.29 SCREENING FOR THYROID DISORDER: ICD-10-CM

## 2023-10-30 DIAGNOSIS — Z13.6 SCREENING FOR CARDIOVASCULAR CONDITION: ICD-10-CM

## 2023-10-30 DIAGNOSIS — R73.9 HYPERGLYCEMIA: ICD-10-CM

## 2023-10-30 DIAGNOSIS — Z12.31 VISIT FOR SCREENING MAMMOGRAM: Primary | ICD-10-CM

## 2023-10-30 DIAGNOSIS — Z13.1 SCREENING FOR DIABETES MELLITUS: ICD-10-CM

## 2023-10-30 DIAGNOSIS — Z00.00 LABORATORY EXAMINATION ORDERED AS PART OF A ROUTINE GENERAL MEDICAL EXAMINATION: ICD-10-CM

## 2023-10-30 DIAGNOSIS — Z13.0 SCREENING FOR IRON DEFICIENCY ANEMIA: ICD-10-CM

## 2023-10-30 NOTE — TELEPHONE ENCOUNTER
Please enter lab orders for the patient's upcoming physical appointment. Physical scheduled: Your appointments       Date & Time Appointment Department [facility])    Migel 15, 2024  8:20 AM CST Adult [facility] [address], [place] ([address])    PLEASE NOTE - Most insurances allow a Complete Physical once every 366 days. Please schedule accordingly. Please arrive 15 minutes prior to your scheduled appointment. Please also bring your Insurance card, Photo ID, and your medication bottles or a list of your current medication. If you no longer require this appointment, please contact your physician office to cancel. Beatriz Skinnering Dr Jade Machado [address] [phone]           [facility])     The patient has been notified to complete fasting labs prior to their physical appointment.

## 2024-01-15 ENCOUNTER — LAB ENCOUNTER (OUTPATIENT)
Dept: LAB | Age: 44
End: 2024-01-15
Attending: FAMILY MEDICINE
Payer: COMMERCIAL

## 2024-01-15 ENCOUNTER — OFFICE VISIT (OUTPATIENT)
Dept: FAMILY MEDICINE CLINIC | Facility: CLINIC | Age: 44
End: 2024-01-15
Payer: COMMERCIAL

## 2024-01-15 VITALS
WEIGHT: 156 LBS | BODY MASS INDEX: 27.64 KG/M2 | HEART RATE: 51 BPM | OXYGEN SATURATION: 98 % | DIASTOLIC BLOOD PRESSURE: 60 MMHG | TEMPERATURE: 98 F | HEIGHT: 63 IN | SYSTOLIC BLOOD PRESSURE: 90 MMHG

## 2024-01-15 DIAGNOSIS — B36.0 TINEA VERSICOLOR: ICD-10-CM

## 2024-01-15 DIAGNOSIS — Z13.0 SCREENING FOR IRON DEFICIENCY ANEMIA: ICD-10-CM

## 2024-01-15 DIAGNOSIS — Z00.00 WELL WOMAN EXAM WITHOUT GYNECOLOGICAL EXAM: Primary | ICD-10-CM

## 2024-01-15 DIAGNOSIS — Z13.1 SCREENING FOR DIABETES MELLITUS: ICD-10-CM

## 2024-01-15 DIAGNOSIS — Z13.29 SCREENING FOR THYROID DISORDER: ICD-10-CM

## 2024-01-15 DIAGNOSIS — Z13.6 SCREENING FOR CARDIOVASCULAR CONDITION: ICD-10-CM

## 2024-01-15 DIAGNOSIS — I49.3 FREQUENT PVCS: ICD-10-CM

## 2024-01-15 DIAGNOSIS — K64.9 HEMORRHOIDS, UNSPECIFIED HEMORRHOID TYPE: ICD-10-CM

## 2024-01-15 DIAGNOSIS — R73.9 HYPERGLYCEMIA: ICD-10-CM

## 2024-01-15 DIAGNOSIS — I49.9 IRREGULAR HEART RHYTHM: ICD-10-CM

## 2024-01-15 DIAGNOSIS — Z00.00 LABORATORY EXAMINATION ORDERED AS PART OF A ROUTINE GENERAL MEDICAL EXAMINATION: ICD-10-CM

## 2024-01-15 LAB
ALBUMIN SERPL-MCNC: 4.1 G/DL (ref 3.4–5)
ALBUMIN/GLOB SERPL: 1.3 {RATIO} (ref 1–2)
ALP LIVER SERPL-CCNC: 41 U/L
ALT SERPL-CCNC: 30 U/L
ANION GAP SERPL CALC-SCNC: 3 MMOL/L (ref 0–18)
AST SERPL-CCNC: 14 U/L (ref 15–37)
ATRIAL RATE: 68 BPM
BASOPHILS # BLD AUTO: 0.06 X10(3) UL (ref 0–0.2)
BASOPHILS NFR BLD AUTO: 1.3 %
BILIRUB SERPL-MCNC: 0.6 MG/DL (ref 0.1–2)
BUN BLD-MCNC: 16 MG/DL (ref 9–23)
CALCIUM BLD-MCNC: 8.9 MG/DL (ref 8.5–10.1)
CHLORIDE SERPL-SCNC: 107 MMOL/L (ref 98–112)
CHOLEST SERPL-MCNC: 171 MG/DL (ref ?–200)
CO2 SERPL-SCNC: 27 MMOL/L (ref 21–32)
CREAT BLD-MCNC: 0.86 MG/DL
EGFRCR SERPLBLD CKD-EPI 2021: 87 ML/MIN/1.73M2 (ref 60–?)
EOSINOPHIL # BLD AUTO: 0.1 X10(3) UL (ref 0–0.7)
EOSINOPHIL NFR BLD AUTO: 2.2 %
ERYTHROCYTE [DISTWIDTH] IN BLOOD BY AUTOMATED COUNT: 13.8 %
EST. AVERAGE GLUCOSE BLD GHB EST-MCNC: 111 MG/DL (ref 68–126)
FASTING PATIENT LIPID ANSWER: YES
FASTING STATUS PATIENT QL REPORTED: YES
GLOBULIN PLAS-MCNC: 3.1 G/DL (ref 2.8–4.4)
GLUCOSE BLD-MCNC: 98 MG/DL (ref 70–99)
HBA1C MFR BLD: 5.5 % (ref ?–5.7)
HCT VFR BLD AUTO: 41.6 %
HDLC SERPL-MCNC: 62 MG/DL (ref 40–59)
HGB BLD-MCNC: 14 G/DL
IMM GRANULOCYTES # BLD AUTO: 0.01 X10(3) UL (ref 0–1)
IMM GRANULOCYTES NFR BLD: 0.2 %
LDLC SERPL CALC-MCNC: 86 MG/DL (ref ?–100)
LYMPHOCYTES # BLD AUTO: 1.41 X10(3) UL (ref 1–4)
LYMPHOCYTES NFR BLD AUTO: 31.4 %
MCH RBC QN AUTO: 32.2 PG (ref 26–34)
MCHC RBC AUTO-ENTMCNC: 33.7 G/DL (ref 31–37)
MCV RBC AUTO: 95.6 FL
MONOCYTES # BLD AUTO: 0.33 X10(3) UL (ref 0.1–1)
MONOCYTES NFR BLD AUTO: 7.3 %
NEUTROPHILS # BLD AUTO: 2.58 X10 (3) UL (ref 1.5–7.7)
NEUTROPHILS # BLD AUTO: 2.58 X10(3) UL (ref 1.5–7.7)
NEUTROPHILS NFR BLD AUTO: 57.6 %
NONHDLC SERPL-MCNC: 109 MG/DL (ref ?–130)
OSMOLALITY SERPL CALC.SUM OF ELEC: 285 MOSM/KG (ref 275–295)
P AXIS: 8 DEGREES
P-R INTERVAL: 150 MS
PLATELET # BLD AUTO: 273 10(3)UL (ref 150–450)
POTASSIUM SERPL-SCNC: 4.2 MMOL/L (ref 3.5–5.1)
PROT SERPL-MCNC: 7.2 G/DL (ref 6.4–8.2)
Q-T INTERVAL: 400 MS
QRS DURATION: 80 MS
QTC CALCULATION (BEZET): 425 MS
R AXIS: 70 DEGREES
RBC # BLD AUTO: 4.35 X10(6)UL
SODIUM SERPL-SCNC: 137 MMOL/L (ref 136–145)
T AXIS: 37 DEGREES
TRIGL SERPL-MCNC: 135 MG/DL (ref 30–149)
TSI SER-ACNC: 3.57 MIU/ML (ref 0.36–3.74)
VENTRICULAR RATE: 68 BPM
VLDLC SERPL CALC-MCNC: 22 MG/DL (ref 0–30)
WBC # BLD AUTO: 4.5 X10(3) UL (ref 4–11)

## 2024-01-15 PROCEDURE — 83036 HEMOGLOBIN GLYCOSYLATED A1C: CPT

## 2024-01-15 PROCEDURE — 85025 COMPLETE CBC W/AUTO DIFF WBC: CPT

## 2024-01-15 PROCEDURE — 80053 COMPREHEN METABOLIC PANEL: CPT

## 2024-01-15 PROCEDURE — 80061 LIPID PANEL: CPT

## 2024-01-15 PROCEDURE — 84443 ASSAY THYROID STIM HORMONE: CPT

## 2024-01-15 RX ORDER — CLOTRIMAZOLE 1 %
1 CREAM (GRAM) TOPICAL 2 TIMES DAILY
Qty: 15 G | Refills: 1 | Status: SHIPPED | OUTPATIENT
Start: 2024-01-15

## 2024-01-15 NOTE — PROGRESS NOTES
SUBJECTIVE:  Chief Complaint   Patient presents with    Physical     HPI:  -Hemorrhoid: Frequency of discomfort and size has increased. Would like this looked at in more detail.   -Is struggling to lose weight. Increased exercise without much improvement.   -Red patch to abdomen. Present the past few months. Denies pruritus.   -R lower eyelid with a small bump. Present a couple of years.     Health Maintenance:  Vaccines: reviewed as below. Indicated today: Tdap (pregnant 5 years ago), declines influenza  Immunization History   Administered Date(s) Administered    Covid-19 Vaccine Pfizer 30 mcg/0.3 ml 05/16/2021, 06/11/2021   Deferred Date(s) Deferred    >=3 YRS TRI  MULTIDOSE VIAL (49072) FLU CLINIC 10/26/2018    MMR 12/03/2018     Obesity screening: Body mass index is 27.63 kg/m².  Diabetes screening:  due  Hypercholesterolemia screening: due  Lab Results   Component Value Date    HDL 58 12/08/2022    HDL 70 (H) 11/23/2021    HDL 72 (H) 08/21/2019     Lab Results   Component Value Date    LDL 83 12/08/2022    LDL 91 11/23/2021     (H) 08/21/2019     Lab Results   Component Value Date    TRIG 96 12/08/2022    TRIG 67 11/23/2021    TRIG 60 08/21/2019        Depression screen: negative  Osteoporosis: No history of pathologic fractures. No steroid use, smoking, risk of falls, excessive alcohol use.  Cervical Cancer screening: Last Pap: 2022  Colon Cancer screening: Last colonoscopy: n/a  Breast Cancer screening:  Last mammogram: 1/2023  STI: Desires testing for STIs? no  Tobacco use:  reports that she has never smoked. She has never used smokeless tobacco.    ROS: Negative unless stated above    HISTORY:  Past Medical History:   Diagnosis Date    Amenorrhea     Irregular menses    Diet controlled gestational diabetes mellitus (GDM) in third trimester 11/13/2018    Fibroids 2016    Seen on OB US    History of chicken pox     age 10      No past surgical history on file.   Family History   Problem Relation Age  of Onset    Hypertension Father     Diabetes Father         type 2    Hypertension Maternal Grandmother     Diabetes Maternal Grandfather         Type II    High Blood Pressure Maternal Grandfather     Cancer Paternal Grandfather         stomach    High Blood Pressure Paternal Grandfather     Dementia Paternal Grandmother         alzheimers    High Blood Pressure Paternal Grandmother       Social History     Socioeconomic History    Marital status:      Spouse name: Britta Malave    Number of children: 2    Years of education: 18   Occupational History    Occupation:    Tobacco Use    Smoking status: Never    Smokeless tobacco: Never   Vaping Use    Vaping Use: Never used   Substance and Sexual Activity    Alcohol use: Yes     Comment: socially    Drug use: No   Other Topics Concern     Service No    Blood Transfusions No    Caffeine Concern Yes     Comment: 2 cups of coffee daily     Occupational Exposure No    Weight Concern No    Special Diet No    Exercise No    Bike Helmet No    Seat Belt Yes    Self-Exams Yes        Allergies:  No Known Allergies    OBJECTIVE:  PHYSICAL EXAM:  Vitals:    01/15/24 0821   BP: 90/60   BP Location: Left arm   Patient Position: Sitting   Cuff Size: large   Pulse: 51   Temp: 98.4 °F (36.9 °C)   TempSrc: Oral   SpO2: 98%   Weight: 156 lb (70.8 kg)   Height: 5' 3\" (1.6 m)     Physical Examination: General appearance - alert, well appearing, and in no distress and normal appearing weight  Mental status - alert, oriented to person, place, and time, normal mood, behavior, speech, dress, motor activity, and thought processes  Ears - bilateral TM's and external ear canals normal  Neck - supple, no significant adenopathy  Chest - clear to auscultation, no wheezes, rales or rhonchi, symmetric air entry  Heart - normal rate, regular rhythm, normal S1, S2, no murmurs, rubs, clicks or gallops  Abdomen - soft, nontender, nondistended, no masses or  organomegaly  Extremities - peripheral pulses normal, no pedal edema, no clubbing or cyanosis    EKG: Rate 68, sinus rhythm, normal axis, no ST segment elevations or depressions, no pathologic Q waves or inverted T waves.  Frequent PVCs, otherwise normal EKG.    ASSESSMENT & PLAN:  Lamont Hood is a 43 year old female is here for Physical    Problem List Items Addressed This Visit    None  Visit Diagnoses       Well woman exam without gynecological exam    -  Primary    Hemorrhoids, unspecified hemorrhoid type        Relevant Orders    Surgery Referral - In Network    Irregular heart rhythm        Relevant Orders    EKG with interpretation and Report -IN OFFICE [[id number]] (Completed)    Frequent PVCs        Relevant Orders    CARD MONITOR HOLTER 24 HOUR (CPT=93225)    Tinea versicolor        Relevant Medications    clotrimazole 1 % External Cream            Lamont was seen today for physical.    Diagnoses and all orders for this visit:    Well woman exam without gynecological exam  Routine health maintenance reviewed, discussed and updated as below. This includes labs. Healthy diet and exercise reviewed.     Hemorrhoids, unspecified hemorrhoid type  -     Surgery Referral - In Network    Irregular heart rhythm  Frequent PVCs  -     EKG with interpretation and Report -IN OFFICE [[id number]]  -Incidentally noted on exam today.  EKG consistent with frequent PVCs.  Instructed patient to decrease caffeine intake, increase fluid intake.  Will get labs to rule out underlying cause (thyroid).  Will obtain Holter monitor to determine frequency.  -     CARD MONITOR HOLTER 24 HOUR (CPT=93225); Future    Tinea versicolor  -     clotrimazole 1 % External Cream; Apply 1 Application topically 2 (two) times daily.    Call or return to clinic prn if these symptoms worsen or fail to improve as anticipated. RTC in 1 year.   Cierra Loaiza DO  1/15/2024 8:49 AM    Note to Patient  The 21st Century Cures Act makes medical notes like these available  to patients in the interest of transparency. However, be advised this is a medical document and is intended as wnio-lw-ilxx communication; it is written in medical language and may appear blunt, direct, or contain abbreviations or verbiage that are unfamiliar. Medical documents are intended to carry relevant information, facts as evident, and the clinical opinion of the practitioner.     This report has been produced using speech recognition software, and may contain errors related to grammar, punctuation, spelling, words or phrases unrecognized or not translated appropriately to text; these errors may be referred to the dictating provider for further clarification and/or addendum as needed.

## 2024-01-18 ENCOUNTER — HOSPITAL ENCOUNTER (OUTPATIENT)
Dept: CV DIAGNOSTICS | Facility: HOSPITAL | Age: 44
Discharge: HOME | End: 2024-01-18
Attending: FAMILY MEDICINE
Payer: COMMERCIAL

## 2024-01-18 DIAGNOSIS — I49.3 FREQUENT PVCS: ICD-10-CM

## 2024-01-18 PROCEDURE — 93225 XTRNL ECG REC<48 HRS REC: CPT | Performed by: FAMILY MEDICINE

## 2024-01-18 PROCEDURE — 93226 XTRNL ECG REC<48 HR SCAN A/R: CPT | Performed by: FAMILY MEDICINE

## 2024-01-18 PROCEDURE — 93227 XTRNL ECG REC<48 HR R&I: CPT | Performed by: FAMILY MEDICINE

## 2024-01-23 DIAGNOSIS — I49.3 FREQUENT PVCS: Primary | ICD-10-CM

## 2024-01-24 NOTE — PROGRESS NOTES
Discussed Holter Monitor results with Lamont by phone, telling her  would like her to f/u with cardiology,Dr.Moeen Alberto. Lamont verbalized understanding. She will get the contact information through [employer].

## 2024-02-17 ENCOUNTER — HOSPITAL ENCOUNTER (OUTPATIENT)
Dept: MAMMOGRAPHY | Facility: HOSPITAL | Age: 44
Discharge: HOME | End: 2024-02-17
Attending: FAMILY MEDICINE
Payer: COMMERCIAL

## 2024-02-17 DIAGNOSIS — Z12.31 VISIT FOR SCREENING MAMMOGRAM: ICD-10-CM

## 2024-02-17 PROCEDURE — 77063 BREAST TOMOSYNTHESIS BI: CPT | Performed by: FAMILY MEDICINE

## 2024-02-17 PROCEDURE — 77067 SCR MAMMO BI INCL CAD: CPT | Performed by: FAMILY MEDICINE

## 2024-02-26 DIAGNOSIS — R92.333 HETEROGENEOUSLY DENSE TISSUE OF BOTH BREASTS ON MAMMOGRAPHY: Primary | ICD-10-CM

## 2024-06-17 ENCOUNTER — HOSPITAL ENCOUNTER (OUTPATIENT)
Age: 44
Discharge: HOME | End: 2024-06-17

## 2024-06-17 VITALS
SYSTOLIC BLOOD PRESSURE: 119 MMHG | DIASTOLIC BLOOD PRESSURE: 63 MMHG | WEIGHT: 148 LBS | BODY MASS INDEX: 26.22 KG/M2 | TEMPERATURE: 98 F | HEIGHT: 63 IN | RESPIRATION RATE: 18 BRPM | OXYGEN SATURATION: 98 % | HEART RATE: 72 BPM

## 2024-06-17 DIAGNOSIS — L02.91 ABSCESS: Primary | ICD-10-CM

## 2024-06-17 PROCEDURE — 10060 I&D ABSCESS SIMPLE/SINGLE: CPT | Performed by: NURSE PRACTITIONER

## 2024-06-17 PROCEDURE — 99203 OFFICE O/P NEW LOW 30 MIN: CPT | Performed by: NURSE PRACTITIONER

## 2024-06-17 RX ORDER — CEPHALEXIN 500 MG/1
500 CAPSULE ORAL 4 TIMES DAILY
Qty: 40 CAPSULE | Refills: 0 | Status: SHIPPED | OUTPATIENT
Start: 2024-06-17 | End: 2024-06-27

## 2024-06-18 NOTE — DISCHARGE INSTRUCTIONS
Please take cephalexin as prescribed.  Keep area clean and dry.  Wound check with your PCP in 3 days.

## 2024-06-18 NOTE — ED PROVIDER NOTES
Patient Seen in: [facility]      History     Chief Complaint   Patient presents with    Rash     Stated Complaint: Skin infection    Subjective:   This a 43-year-old female with no significant past medical history.  Presents to immediate care for painful bump in her left groin area.  She has been using mupirocin ointment with no relief.  Reports area has been draining.  No fevers or chills.  No known history of MRSA.    The history is provided by the patient.           Objective:   No pertinent past medical history.            No pertinent past surgical history.              No pertinent social history.            Review of Systems   Constitutional:  Negative for chills and fever.   HENT:  Negative for congestion and sore throat.    Respiratory:  Negative for cough, shortness of breath and wheezing.    Cardiovascular:  Negative for chest pain, palpitations and leg swelling.   Gastrointestinal:  Negative for abdominal pain, constipation, diarrhea, nausea and vomiting.   Genitourinary:  Negative for dysuria.   Musculoskeletal:  Negative for back pain, neck pain and neck stiffness.   Skin:  Positive for wound.   Allergic/Immunologic: Negative for environmental allergies.   Neurological:  Negative for headaches.   Hematological:  Does not bruise/bleed easily.       Positive for stated complaint: Skin infection  Other systems are as noted in HPI.  Constitutional and vital signs reviewed.      All other systems reviewed and negative except as noted above.    Physical Exam     ED Triage Vitals   BP 06/17/24 1913 119/63   Pulse 06/17/24 1912 72   Resp 06/17/24 1912 18   Temp 06/17/24 1912 98.3 °F (36.8 °C)   Temp src 06/17/24 1912 Temporal   SpO2 06/17/24 1912 98 %   O2 Device 06/17/24 1912 None (Room air)       Current Vitals:   Vital Signs  BP: 119/63  Pulse: 72  Resp: 18  Temp: 98.3 °F (36.8 °C)  Temp src: Temporal    Oxygen Therapy  SpO2: 98 %  O2 Device: None (Room air)            Physical  Exam  Vitals and nursing note reviewed.   Constitutional:       General: She is not in acute distress.     Appearance: Normal appearance. She is not ill-appearing, toxic-appearing or diaphoretic.   HENT:      Head: Normocephalic and atraumatic.      Right Ear: External ear normal.      Left Ear: External ear normal.      Nose: Nose normal.      Mouth/Throat:      Mouth: Mucous membranes are moist.      Pharynx: Oropharynx is clear.   Eyes:      General:         Right eye: No discharge.         Left eye: No discharge.      Extraocular Movements: Extraocular movements intact.      Conjunctiva/sclera: Conjunctivae normal.   Cardiovascular:      Rate and Rhythm: Normal rate.   Pulmonary:      Effort: Pulmonary effort is normal.   Genitourinary:     Exam position: Supine.      Labia:         Left: Tenderness present.        Musculoskeletal:      Cervical back: Neck supple.      Right lower leg: No edema.      Left lower leg: No edema.   Skin:     Capillary Refill: Capillary refill takes less than 2 seconds.      Findings: No rash.   Neurological:      Mental Status: She is alert and oriented to person, place, and time.   Psychiatric:         Mood and Affect: Mood normal.         Behavior: Behavior normal.               ED Course   Labs Reviewed - No data to display          I and D         MDM      Vital signs stable.  Patient is well-appearing and nontoxic looking presents to immediate care for abscess to left groin area.    Differential diagnosis includes was not limited to cellulitis, abscess, MRSA, folliculitis    There is approximate 2 cm area diameter of erythema with mild fluctuance.  Area appears to be draining.    Procedure: Incision and drainage  Procedure note:   Verbal consent obtained from patient  Area was prepped sterile  1% Lidocaine injected into the abscess for local anesthesia  After attaining significant anesthesia, using a #11 blade, an incision was made into the most fluctuant portion of the  abscess.   0 ml of purulent drainage was noted. Abscess was evacuated further with expression  Septations in the abscess were broken using straight/curved forceps  Wound was then dressed with Neosporin ointment and dry gauze dressing  Patient tolerated the procedure well.     Clinical impression is cellulitis from abscess that has already drained    DC home.  Course of Keflex prescribed.  Wound instructions including wound check in 3 days with PCP discussed.  Reasons to seek emergent reevaluation reviewed.  She verbalized understanding, and agreed plan of care.  Questions answered.                               Medical Decision Making      Disposition and Plan     Clinical Impression:  1. Abscess         Disposition:  Discharge  6/17/2024  7:43 pm    Follow-up:  Cierra Loaiza,   [address]  [address]  [facility] [address] [phone]    In 3 days  For wound re-check          Medications Prescribed:  Discharge Medication List as of 6/17/2024  7:44 PM        START taking these medications    Details   cephalexin 500 MG Oral Cap Take 1 capsule (500 mg total) by mouth 4 (four) times daily for 10 days., Normal, Disp-40 capsule, R-0

## 2025-01-08 ENCOUNTER — TELEPHONE (OUTPATIENT)
Dept: FAMILY MEDICINE CLINIC | Facility: CLINIC | Age: 45
End: 2025-01-08

## 2025-01-08 DIAGNOSIS — Z00.00 ROUTINE HEALTH MAINTENANCE: Primary | ICD-10-CM

## 2025-01-08 NOTE — TELEPHONE ENCOUNTER
Please enter lab orders for the patient's upcoming physical appointment.     Physical scheduled:   Your appointments       Date & Time Appointment Department ([place])    Jan 21, 2025 5:00 PM CST Adult Physical with Cierra Loaiza DO [facility], [facility] ([facility])    PLEASE NOTE - Most insurances allow a Complete Physical once every 366 days. Please schedule accordingly.    Please arrive 15 minutes prior to your scheduled appointment. Please also bring your Insurance card, Photo ID, and your medication bottles or a list of your current medication.    If you no longer require this appointment, please contact your physician office to cancel.              [facility], [facility] [address]  [address] [place] [address]  [phone]           Preferred lab: [facility] ([facility])     The patient has been notified to complete fasting labs prior to their physical appointment.

## 2025-01-16 ENCOUNTER — LAB ENCOUNTER (OUTPATIENT)
Dept: LAB | Facility: HOSPITAL | Age: 45
End: 2025-01-16
Attending: FAMILY MEDICINE
Payer: COMMERCIAL

## 2025-01-16 DIAGNOSIS — Z00.00 ROUTINE HEALTH MAINTENANCE: ICD-10-CM

## 2025-01-16 LAB
ALBUMIN SERPL-MCNC: 4.3 G/DL (ref 3.2–4.8)
ALBUMIN/GLOB SERPL: 1.9 {RATIO} (ref 1–2)
ALP LIVER SERPL-CCNC: 41 U/L
ALT SERPL-CCNC: 18 U/L
ANION GAP SERPL CALC-SCNC: 6 MMOL/L (ref 0–18)
AST SERPL-CCNC: 18 U/L (ref ?–34)
BASOPHILS # BLD AUTO: 0.07 X10(3) UL (ref 0–0.2)
BASOPHILS NFR BLD AUTO: 1.9 %
BILIRUB SERPL-MCNC: 0.7 MG/DL (ref 0.3–1.2)
BUN BLD-MCNC: 11 MG/DL (ref 9–23)
CALCIUM BLD-MCNC: 9.5 MG/DL (ref 8.7–10.6)
CHLORIDE SERPL-SCNC: 105 MMOL/L (ref 98–112)
CHOLEST SERPL-MCNC: 180 MG/DL (ref ?–200)
CO2 SERPL-SCNC: 30 MMOL/L (ref 21–32)
CREAT BLD-MCNC: 0.9 MG/DL
EGFRCR SERPLBLD CKD-EPI 2021: 82 ML/MIN/1.73M2 (ref 60–?)
EOSINOPHIL # BLD AUTO: 0.1 X10(3) UL (ref 0–0.7)
EOSINOPHIL NFR BLD AUTO: 2.8 %
ERYTHROCYTE [DISTWIDTH] IN BLOOD BY AUTOMATED COUNT: 12.9 %
EST. AVERAGE GLUCOSE BLD GHB EST-MCNC: 108 MG/DL (ref 68–126)
FASTING PATIENT LIPID ANSWER: YES
FASTING STATUS PATIENT QL REPORTED: YES
GLOBULIN PLAS-MCNC: 2.3 G/DL (ref 2–3.5)
GLUCOSE BLD-MCNC: 91 MG/DL (ref 70–99)
HBA1C MFR BLD: 5.4 % (ref ?–5.7)
HCT VFR BLD AUTO: 38.9 %
HDLC SERPL-MCNC: 55 MG/DL (ref 40–59)
HGB BLD-MCNC: 13 G/DL
IMM GRANULOCYTES # BLD AUTO: 0 X10(3) UL (ref 0–1)
IMM GRANULOCYTES NFR BLD: 0 %
LDLC SERPL CALC-MCNC: 105 MG/DL (ref ?–100)
LYMPHOCYTES # BLD AUTO: 1.4 X10(3) UL (ref 1–4)
LYMPHOCYTES NFR BLD AUTO: 38.7 %
MCH RBC QN AUTO: 32.1 PG (ref 26–34)
MCHC RBC AUTO-ENTMCNC: 33.4 G/DL (ref 31–37)
MCV RBC AUTO: 96 FL
MONOCYTES # BLD AUTO: 0.31 X10(3) UL (ref 0.1–1)
MONOCYTES NFR BLD AUTO: 8.6 %
NEUTROPHILS # BLD AUTO: 1.74 X10 (3) UL (ref 1.5–7.7)
NEUTROPHILS # BLD AUTO: 1.74 X10(3) UL (ref 1.5–7.7)
NEUTROPHILS NFR BLD AUTO: 48 %
NONHDLC SERPL-MCNC: 125 MG/DL (ref ?–130)
OSMOLALITY SERPL CALC.SUM OF ELEC: 291 MOSM/KG (ref 275–295)
PLATELET # BLD AUTO: 287 10(3)UL (ref 150–450)
POTASSIUM SERPL-SCNC: 4.1 MMOL/L (ref 3.5–5.1)
PROT SERPL-MCNC: 6.6 G/DL (ref 5.7–8.2)
RBC # BLD AUTO: 4.05 X10(6)UL
SODIUM SERPL-SCNC: 141 MMOL/L (ref 136–145)
TRIGL SERPL-MCNC: 109 MG/DL (ref 30–149)
TSI SER-ACNC: 3.35 UIU/ML (ref 0.55–4.78)
VLDLC SERPL CALC-MCNC: 18 MG/DL (ref 0–30)
WBC # BLD AUTO: 3.6 X10(3) UL (ref 4–11)

## 2025-01-16 PROCEDURE — 80061 LIPID PANEL: CPT

## 2025-01-16 PROCEDURE — 83036 HEMOGLOBIN GLYCOSYLATED A1C: CPT

## 2025-01-16 PROCEDURE — 36415 COLL VENOUS BLD VENIPUNCTURE: CPT

## 2025-01-16 PROCEDURE — 84443 ASSAY THYROID STIM HORMONE: CPT

## 2025-01-16 PROCEDURE — 85025 COMPLETE CBC W/AUTO DIFF WBC: CPT

## 2025-01-16 PROCEDURE — 80053 COMPREHEN METABOLIC PANEL: CPT

## 2025-01-21 ENCOUNTER — OFFICE VISIT (OUTPATIENT)
Dept: FAMILY MEDICINE CLINIC | Facility: CLINIC | Age: 45
End: 2025-01-21
Payer: COMMERCIAL

## 2025-01-21 VITALS
DIASTOLIC BLOOD PRESSURE: 62 MMHG | WEIGHT: 158.63 LBS | RESPIRATION RATE: 16 BRPM | SYSTOLIC BLOOD PRESSURE: 116 MMHG | TEMPERATURE: 99 F | HEART RATE: 50 BPM | OXYGEN SATURATION: 100 % | BODY MASS INDEX: 28.11 KG/M2 | HEIGHT: 63.15 IN

## 2025-01-21 DIAGNOSIS — D72.819 LEUKOPENIA, UNSPECIFIED TYPE: ICD-10-CM

## 2025-01-21 DIAGNOSIS — Z00.00 WELL WOMAN EXAM WITHOUT GYNECOLOGICAL EXAM: Primary | ICD-10-CM

## 2025-01-21 DIAGNOSIS — Z12.31 VISIT FOR SCREENING MAMMOGRAM: ICD-10-CM

## 2025-01-21 RX ORDER — DOXYCYCLINE 50 MG/1
TABLET ORAL
COMMUNITY
Start: 2024-09-27

## 2025-01-21 RX ORDER — METOPROLOL SUCCINATE 25 MG/1
TABLET, EXTENDED RELEASE ORAL
COMMUNITY
Start: 2025-01-15

## 2025-01-21 NOTE — PROGRESS NOTES
SUBJECTIVE:  Chief Complaint   Patient presents with    Physical     WWE breast exam / no pap     HPI:    Basal cell CA- underneath R eye. Seeing derm/ophthalmology for Mohs surgery and repair  Hemorrhoid still present, about the same. Does have some rectal bleeding, blood with wiping.   Frequent PVCs, following with cardiology. On metoprolol,, not consistent with taking.     Health Maintenance:  Vaccines: reviewed as below. Indicated today:Tdap- declines, influenza (Declines)  Immunization History   Administered Date(s) Administered    Covid-19 Vaccine Pfizer 30 mcg/0.3 ml 05/16/2021, 06/11/2021   Deferred Date(s) Deferred    >=3 YRS TRI  MULTIDOSE VIAL (25144) FLU CLINIC 10/26/2018    MMR 12/03/2018     Obesity screening: Body mass index is 27.96 kg/m². Trying to exercise but not as regular  Diabetes screening:  negative  Hypercholesterolemia screening:   Lab Results   Component Value Date    HDL 55 01/16/2025    HDL 62 (H) 01/15/2024    HDL 58 12/08/2022     Lab Results   Component Value Date     (H) 01/16/2025    LDL 86 01/15/2024    LDL 83 12/08/2022     Lab Results   Component Value Date    TRIG 109 01/16/2025    TRIG 135 01/15/2024    TRIG 96 12/08/2022        Depression screen:     Depression Screening (PHQ-2/PHQ-9): Over the LAST 2 WEEKS   Little interest or pleasure in doing things: Not at all    Feeling down, depressed, or hopeless: Not at all    PHQ-2 SCORE: 0        Osteoporosis: No history of pathologic fractures. No steroid use, smoking, risk of falls, excessive alcohol use.  Cervical Cancer screening: Last Pap: 12/2022      Colon Cancer screening: Family history of colon cancer? no   Breast Cancer screening: Last mammogram: 2/2024  STI: Desires testing for STIs? no  Tobacco use:  reports that she has never smoked. She has never used smokeless tobacco.    ROS: Negative unless stated above    HISTORY:  Past Medical History:    Amenorrhea    Irregular menses    Diet controlled gestational  diabetes mellitus (GDM) in third trimester (HCC)    Fibroids    Seen on OB US    History of chicken pox    age 10      History reviewed. No pertinent surgical history.   Family History   Problem Relation Age of Onset    Hypertension Father     Diabetes Father         type 2    Hypertension Maternal Grandmother     Diabetes Maternal Grandfather         Type II    High Blood Pressure Maternal Grandfather     Cancer Paternal Grandfather         stomach    High Blood Pressure Paternal Grandfather     Dementia Paternal Grandmother         alzheimers    High Blood Pressure Paternal Grandmother       Social History     Socioeconomic History    Marital status:      Spouse name: Britta Malave    Number of children: 2    Years of education: 18   Occupational History    Occupation:    Tobacco Use    Smoking status: Never    Smokeless tobacco: Never   Vaping Use    Vaping status: Never Used   Substance and Sexual Activity    Alcohol use: Yes     Comment: socially    Drug use: No   Other Topics Concern     Service No    Blood Transfusions No    Caffeine Concern No    Occupational Exposure No    Weight Concern No    Special Diet No    Exercise No    Bike Helmet No    Seat Belt Yes    Self-Exams Yes        Allergies:  Allergies[1]    OBJECTIVE:  PHYSICAL EXAM:  Vitals:    01/21/25 1715   BP: 116/62   BP Location: Right arm   Pulse: 50   Resp: 16   Temp: 98.6 °F (37 °C)   TempSrc: Oral   SpO2: 100%   Weight: 158 lb 9.6 oz (71.9 kg)   Height: 5' 3.15\" (1.604 m)     Physical Examination: General appearance - alert, well appearing, and in no distress and normal appearing weight  Mental status - alert, oriented to person, place, and time, normal mood, behavior, speech, dress, motor activity, and thought processes  Ears - bilateral TM's and external ear canals normal  Neck - supple, no significant adenopathy  Chest - clear to auscultation, no wheezes, rales or rhonchi, symmetric air entry  Heart - normal rate,  regular rhythm, normal S1, S2, no murmurs, rubs, clicks or gallops  Abdomen - soft, nontender, nondistended, no masses or organomegaly  Breasts - breasts appear normal, no suspicious masses, no skin or nipple changes or axillary nodes  Extremities - peripheral pulses normal, no pedal edema, no clubbing or cyanosis    ASSESSMENT & PLAN:  Lamont Hood is a 44 year old female is here for Physical (WWE breast exam / no pap)    Problem List Items Addressed This Visit    None  Visit Diagnoses       Well woman exam without gynecological exam    -  Primary    Relevant Medications    metoprolol succinate ER 25 MG Oral Tablet 24 Hr    Visit for screening mammogram        Relevant Medications    Doxycycline Monohydrate 50 MG Oral Tab    Other Relevant Orders    [facility] KIKO 2D+3D SCREENING BILAT (CPT=77067/10352)    Leukopenia, unspecified type        Relevant Orders    CBC With Differential With Platelet          Lamont was seen today for physical.    Diagnoses and all orders for this visit:    Well woman exam without gynecological exam  Routine health maintenance reviewed, discussed and updated as below. This includes: [facility]. Healthy diet and exercise reviewed.     Visit for screening mammogram  -     [facility] KIKO 2D+3D SCREENING BILAT (CPT=77067/63703); Future    Leukopenia, unspecified type  -    Likely baseline, will repeat CBC With Differential With Platelet; Future    Call or return to clinic prn if these symptoms worsen or fail to improve as anticipated. RTC in 1 year.   Cierra Loaiza DO  1/21/2025 5:41 PM    Note to Patient  The 21st Century Cures Act makes medical notes like these available to patients in the interest of transparency. However, be advised this is a medical document and is intended as wezy-qm-nzmr communication; it is written in medical language and may appear blunt, direct, or contain abbreviations or verbiage that are unfamiliar. Medical documents are intended to carry relevant information, facts as evident, and  the clinical opinion of the practitioner.     This report has been produced using speech recognition software, and may contain errors related to grammar, punctuation, spelling, words or phrases unrecognized or not translated appropriately to text; these errors may be referred to the dictating provider for further clarification and/or addendum as needed.         [1] No Known Allergies

## 2025-01-25 PROBLEM — I49.3 FREQUENT PVCS: Status: ACTIVE | Noted: 2024-03-15

## 2025-02-24 ENCOUNTER — LAB ENCOUNTER (OUTPATIENT)
Dept: LAB | Age: 45
End: 2025-02-24
Attending: FAMILY MEDICINE
Payer: COMMERCIAL

## 2025-02-24 DIAGNOSIS — D72.819 LEUKOPENIA, UNSPECIFIED TYPE: ICD-10-CM

## 2025-02-24 LAB
BASOPHILS # BLD AUTO: 0.07 X10(3) UL (ref 0–0.2)
BASOPHILS NFR BLD AUTO: 1.8 %
EOSINOPHIL # BLD AUTO: 0.24 X10(3) UL (ref 0–0.7)
EOSINOPHIL NFR BLD AUTO: 6.1 %
ERYTHROCYTE [DISTWIDTH] IN BLOOD BY AUTOMATED COUNT: 13.2 %
HCT VFR BLD AUTO: 38.7 %
HGB BLD-MCNC: 13 G/DL
IMM GRANULOCYTES # BLD AUTO: 0.01 X10(3) UL (ref 0–1)
IMM GRANULOCYTES NFR BLD: 0.3 %
LYMPHOCYTES # BLD AUTO: 1.36 X10(3) UL (ref 1–4)
LYMPHOCYTES NFR BLD AUTO: 34.3 %
MCH RBC QN AUTO: 32.1 PG (ref 26–34)
MCHC RBC AUTO-ENTMCNC: 33.6 G/DL (ref 31–37)
MCV RBC AUTO: 95.6 FL
MONOCYTES # BLD AUTO: 0.33 X10(3) UL (ref 0.1–1)
MONOCYTES NFR BLD AUTO: 8.3 %
NEUTROPHILS # BLD AUTO: 1.95 X10 (3) UL (ref 1.5–7.7)
NEUTROPHILS # BLD AUTO: 1.95 X10(3) UL (ref 1.5–7.7)
NEUTROPHILS NFR BLD AUTO: 49.2 %
PLATELET # BLD AUTO: 297 10(3)UL (ref 150–450)
RBC # BLD AUTO: 4.05 X10(6)UL
WBC # BLD AUTO: 4 X10(3) UL (ref 4–11)

## 2025-02-24 PROCEDURE — 85025 COMPLETE CBC W/AUTO DIFF WBC: CPT | Performed by: FAMILY MEDICINE

## 2025-02-25 ENCOUNTER — HOSPITAL ENCOUNTER (OUTPATIENT)
Dept: MAMMOGRAPHY | Age: 45
Discharge: HOME | End: 2025-02-25
Attending: FAMILY MEDICINE
Payer: COMMERCIAL

## 2025-02-25 DIAGNOSIS — Z12.31 VISIT FOR SCREENING MAMMOGRAM: ICD-10-CM

## 2025-02-25 PROCEDURE — 77067 SCR MAMMO BI INCL CAD: CPT | Performed by: FAMILY MEDICINE

## 2025-02-25 PROCEDURE — 77063 BREAST TOMOSYNTHESIS BI: CPT | Performed by: FAMILY MEDICINE

## (undated) NOTE — IP AVS SNAPSHOT
[address], [facility], [facility] ~ [phone]                Discharge Summary   1/28/2017    [facility]           Admission Information        Provider Department    1/28/2017 Darcy Diaz CNM [facility] 3se Please take Motrin at home for pain control 600mg every 6 hours as needed   If you have stitches  You may have received stitches in the skin near your vagina.  The stitches might have closed an episiotomy (an incision that enlarges the opening of the vagina · Feelings of extreme sadness or anxiety, or a feeling that you don’t want to be with your baby  · Abdominal pain that isn’t relieved with medicine  · No bowel movement for 5 days  · Redness, warmth, or pain in the lower leg  · Chest pain               Pre 10.3    (11/16/16)  6.8 (11/16/16)  3.48 (L) (01/29/17)  13.4 (01/29/17)  40.4 (11/16/16)  96.2 (11/16/16)  32.0 (11/16/16)  33.2  (11/16/16)  307 (11/16/16)  8.9      (11/16/16)  11.1 (L) (11/16/16)  33.5 (L)            Radiology Exams     None      Disch Medicaid plans. To get signed up and covered, please call [phone] and ask to get set up for an insurance coverage that is in-network with [facility].         MyChart     Visit [employer]  You can access your MyChart to more actively manage

## (undated) NOTE — MR AVS SNAPSHOT
Mike  [address]  [phone]               Thank you for choosing us for your health care visit with Raquel Foster CNM.   We are glad to serve you and happy to provide you with this s Call [phone] for help. [facility] is NOT to be used for urgent needs. For medical emergencies, dial 911.            Visit [facility] online at  [email or web address]

## (undated) NOTE — LETTER
03/19/18        [address] [place] [address]  [address]      Dear Lamont,    Our records indicate that you have outstanding lab work and or testing that was ordered for you and has not yet been completed:          CBC W Differential W Platelet [E

## (undated) NOTE — Clinical Note
IUP @  20w4d  Scan consistent with dates No fetal structural abnormalities seen  AMA -  She declined invasive genetic testing   RECOMMENDATIONS: 1. Weekly NST at 36wks 2.   Growth US at 32wks

## (undated) NOTE — LETTER
12/18/17        [address]      Dear Lamont,    Our records indicate that you have outstanding lab work and or testing that was ordered for you and has not yet been completed:          CBC W Differential W Platelet [E